# Patient Record
Sex: FEMALE | Race: WHITE | NOT HISPANIC OR LATINO | Employment: UNEMPLOYED | URBAN - NONMETROPOLITAN AREA
[De-identification: names, ages, dates, MRNs, and addresses within clinical notes are randomized per-mention and may not be internally consistent; named-entity substitution may affect disease eponyms.]

---

## 2018-03-28 ENCOUNTER — APPOINTMENT (OUTPATIENT)
Dept: ULTRASOUND IMAGING | Facility: HOSPITAL | Age: 48
End: 2018-03-28

## 2018-03-28 ENCOUNTER — APPOINTMENT (OUTPATIENT)
Dept: CT IMAGING | Facility: HOSPITAL | Age: 48
End: 2018-03-28

## 2018-03-28 ENCOUNTER — HOSPITAL ENCOUNTER (EMERGENCY)
Facility: HOSPITAL | Age: 48
Discharge: HOME | End: 2018-03-29
Admitting: EMERGENCY MEDICINE

## 2018-03-28 DIAGNOSIS — N30.90 CYSTITIS: ICD-10-CM

## 2018-03-28 DIAGNOSIS — R10.31 RLQ ABDOMINAL PAIN: Primary | ICD-10-CM

## 2018-03-28 DIAGNOSIS — D25.9 UTERINE LEIOMYOMA, UNSPECIFIED LOCATION: ICD-10-CM

## 2018-03-28 DIAGNOSIS — K59.00 CONSTIPATION, UNSPECIFIED CONSTIPATION TYPE: ICD-10-CM

## 2018-03-28 LAB
ALBUMIN SERPL-MCNC: 4.5 G/DL (ref 3.5–5)
ALBUMIN/GLOB SERPL: 1.3 G/DL (ref 1.1–2.5)
ALP SERPL-CCNC: 51 U/L (ref 24–120)
ALT SERPL W P-5'-P-CCNC: 20 U/L (ref 0–54)
ANION GAP SERPL CALCULATED.3IONS-SCNC: 13 MMOL/L (ref 4–13)
AST SERPL-CCNC: 26 U/L (ref 7–45)
BACTERIA UR QL AUTO: ABNORMAL /HPF
BASOPHILS # BLD AUTO: 0.07 10*3/MM3 (ref 0–0.2)
BASOPHILS NFR BLD AUTO: 0.6 % (ref 0–2)
BILIRUB SERPL-MCNC: 0.3 MG/DL (ref 0.1–1)
BILIRUB UR QL STRIP: NEGATIVE
BUN BLD-MCNC: 16 MG/DL (ref 5–21)
BUN/CREAT SERPL: 15.8 (ref 7–25)
CALCIUM SPEC-SCNC: 9.7 MG/DL (ref 8.4–10.4)
CHLORIDE SERPL-SCNC: 98 MMOL/L (ref 98–110)
CLARITY UR: CLEAR
CO2 SERPL-SCNC: 27 MMOL/L (ref 24–31)
COLOR UR: YELLOW
CREAT BLD-MCNC: 1.01 MG/DL (ref 0.5–1.4)
DEPRECATED RDW RBC AUTO: 39.8 FL (ref 40–54)
EOSINOPHIL # BLD AUTO: 0.22 10*3/MM3 (ref 0–0.7)
EOSINOPHIL NFR BLD AUTO: 1.9 % (ref 0–4)
ERYTHROCYTE [DISTWIDTH] IN BLOOD BY AUTOMATED COUNT: 12.1 % (ref 12–15)
GFR SERPL CREATININE-BSD FRML MDRD: 58 ML/MIN/1.73
GLOBULIN UR ELPH-MCNC: 3.6 GM/DL
GLUCOSE BLD-MCNC: 96 MG/DL (ref 70–100)
GLUCOSE UR STRIP-MCNC: NEGATIVE MG/DL
HCG SERPL QL: NEGATIVE
HCT VFR BLD AUTO: 44.1 % (ref 37–47)
HGB BLD-MCNC: 14.7 G/DL (ref 12–16)
HGB UR QL STRIP.AUTO: NEGATIVE
HOLD SPECIMEN: NORMAL
HOLD SPECIMEN: NORMAL
HYALINE CASTS UR QL AUTO: ABNORMAL /LPF
IMM GRANULOCYTES # BLD: 0.08 10*3/MM3 (ref 0–0.03)
IMM GRANULOCYTES NFR BLD: 0.7 % (ref 0–5)
KETONES UR QL STRIP: NEGATIVE
LEUKOCYTE ESTERASE UR QL STRIP.AUTO: ABNORMAL
LIPASE SERPL-CCNC: 86 U/L (ref 23–203)
LYMPHOCYTES # BLD AUTO: 3.42 10*3/MM3 (ref 0.72–4.86)
LYMPHOCYTES NFR BLD AUTO: 29.2 % (ref 15–45)
MCH RBC QN AUTO: 29.8 PG (ref 28–32)
MCHC RBC AUTO-ENTMCNC: 33.3 G/DL (ref 33–36)
MCV RBC AUTO: 89.5 FL (ref 82–98)
MONOCYTES # BLD AUTO: 0.64 10*3/MM3 (ref 0.19–1.3)
MONOCYTES NFR BLD AUTO: 5.5 % (ref 4–12)
NEUTROPHILS # BLD AUTO: 7.3 10*3/MM3 (ref 1.87–8.4)
NEUTROPHILS NFR BLD AUTO: 62.1 % (ref 39–78)
NITRITE UR QL STRIP: NEGATIVE
NRBC BLD MANUAL-RTO: 0 /100 WBC (ref 0–0)
PH UR STRIP.AUTO: 6 [PH] (ref 5–8)
PLATELET # BLD AUTO: 325 10*3/MM3 (ref 130–400)
PMV BLD AUTO: 10.2 FL (ref 6–12)
POTASSIUM BLD-SCNC: 4.3 MMOL/L (ref 3.5–5.3)
PROT SERPL-MCNC: 8.1 G/DL (ref 6.3–8.7)
PROT UR QL STRIP: NEGATIVE
RBC # BLD AUTO: 4.93 10*6/MM3 (ref 4.2–5.4)
RBC # UR: ABNORMAL /HPF
REF LAB TEST METHOD: ABNORMAL
SODIUM BLD-SCNC: 138 MMOL/L (ref 135–145)
SP GR UR STRIP: 1.02 (ref 1–1.03)
SQUAMOUS #/AREA URNS HPF: ABNORMAL /HPF
UROBILINOGEN UR QL STRIP: ABNORMAL
WBC NRBC COR # BLD: 11.73 10*3/MM3 (ref 4.8–10.8)
WBC UR QL AUTO: ABNORMAL /HPF
WHOLE BLOOD HOLD SPECIMEN: NORMAL
WHOLE BLOOD HOLD SPECIMEN: NORMAL

## 2018-03-28 PROCEDURE — 81001 URINALYSIS AUTO W/SCOPE: CPT | Performed by: NURSE PRACTITIONER

## 2018-03-28 PROCEDURE — 96376 TX/PRO/DX INJ SAME DRUG ADON: CPT

## 2018-03-28 PROCEDURE — 25010000002 MORPHINE PER 10 MG: Performed by: NURSE PRACTITIONER

## 2018-03-28 PROCEDURE — 85025 COMPLETE CBC W/AUTO DIFF WBC: CPT | Performed by: NURSE PRACTITIONER

## 2018-03-28 PROCEDURE — 25010000002 ONDANSETRON PER 1 MG: Performed by: NURSE PRACTITIONER

## 2018-03-28 PROCEDURE — 25010000002 KETOROLAC TROMETHAMINE PER 15 MG: Performed by: NURSE PRACTITIONER

## 2018-03-28 PROCEDURE — 83690 ASSAY OF LIPASE: CPT | Performed by: NURSE PRACTITIONER

## 2018-03-28 PROCEDURE — 99284 EMERGENCY DEPT VISIT MOD MDM: CPT

## 2018-03-28 PROCEDURE — 93976 VASCULAR STUDY: CPT

## 2018-03-28 PROCEDURE — 74177 CT ABD & PELVIS W/CONTRAST: CPT

## 2018-03-28 PROCEDURE — 80053 COMPREHEN METABOLIC PANEL: CPT | Performed by: NURSE PRACTITIONER

## 2018-03-28 PROCEDURE — 87086 URINE CULTURE/COLONY COUNT: CPT | Performed by: NURSE PRACTITIONER

## 2018-03-28 PROCEDURE — 25010000002 HYDROMORPHONE PER 4 MG: Performed by: NURSE PRACTITIONER

## 2018-03-28 PROCEDURE — 25010000002 IOPAMIDOL 61 % SOLUTION: Performed by: NURSE PRACTITIONER

## 2018-03-28 PROCEDURE — 96374 THER/PROPH/DIAG INJ IV PUSH: CPT

## 2018-03-28 PROCEDURE — 84703 CHORIONIC GONADOTROPIN ASSAY: CPT | Performed by: NURSE PRACTITIONER

## 2018-03-28 PROCEDURE — 76830 TRANSVAGINAL US NON-OB: CPT

## 2018-03-28 PROCEDURE — 96375 TX/PRO/DX INJ NEW DRUG ADDON: CPT

## 2018-03-28 RX ORDER — ONDANSETRON 2 MG/ML
4 INJECTION INTRAMUSCULAR; INTRAVENOUS ONCE
Status: COMPLETED | OUTPATIENT
Start: 2018-03-28 | End: 2018-03-28

## 2018-03-28 RX ORDER — MORPHINE SULFATE 4 MG/ML
4 INJECTION, SOLUTION INTRAMUSCULAR; INTRAVENOUS ONCE
Status: COMPLETED | OUTPATIENT
Start: 2018-03-28 | End: 2018-03-28

## 2018-03-28 RX ORDER — SODIUM CHLORIDE 0.9 % (FLUSH) 0.9 %
10 SYRINGE (ML) INJECTION AS NEEDED
Status: DISCONTINUED | OUTPATIENT
Start: 2018-03-28 | End: 2018-03-29 | Stop reason: HOSPADM

## 2018-03-28 RX ORDER — MELATONIN
4000 DAILY
COMMUNITY

## 2018-03-28 RX ORDER — HYDROMORPHONE HCL 110MG/55ML
0.5 PATIENT CONTROLLED ANALGESIA SYRINGE INTRAVENOUS ONCE
Status: COMPLETED | OUTPATIENT
Start: 2018-03-28 | End: 2018-03-28

## 2018-03-28 RX ORDER — KETOROLAC TROMETHAMINE 15 MG/ML
15 INJECTION, SOLUTION INTRAMUSCULAR; INTRAVENOUS ONCE
Status: COMPLETED | OUTPATIENT
Start: 2018-03-28 | End: 2018-03-28

## 2018-03-28 RX ADMIN — KETOROLAC TROMETHAMINE 15 MG: 15 INJECTION, SOLUTION INTRAMUSCULAR; INTRAVENOUS at 22:11

## 2018-03-28 RX ADMIN — ONDANSETRON 4 MG: 2 INJECTION, SOLUTION INTRAMUSCULAR; INTRAVENOUS at 23:52

## 2018-03-28 RX ADMIN — IOPAMIDOL 100 ML: 612 INJECTION, SOLUTION INTRAVENOUS at 21:23

## 2018-03-28 RX ADMIN — ONDANSETRON 4 MG: 2 INJECTION INTRAMUSCULAR; INTRAVENOUS at 20:12

## 2018-03-28 RX ADMIN — HYDROMORPHONE HYDROCHLORIDE 0.5 MG: 2 INJECTION, SOLUTION INTRAMUSCULAR; INTRAVENOUS; SUBCUTANEOUS at 20:59

## 2018-03-28 RX ADMIN — MORPHINE SULFATE 4 MG: 4 INJECTION INTRAVENOUS at 20:16

## 2018-03-28 RX ADMIN — HYDROMORPHONE HYDROCHLORIDE 1 MG: 1 INJECTION, SOLUTION INTRAMUSCULAR; INTRAVENOUS; SUBCUTANEOUS at 22:12

## 2018-03-29 VITALS
BODY MASS INDEX: 30.39 KG/M2 | RESPIRATION RATE: 16 BRPM | HEIGHT: 64 IN | SYSTOLIC BLOOD PRESSURE: 125 MMHG | OXYGEN SATURATION: 97 % | HEART RATE: 73 BPM | DIASTOLIC BLOOD PRESSURE: 75 MMHG | TEMPERATURE: 97.8 F | WEIGHT: 178 LBS

## 2018-03-29 PROCEDURE — 96372 THER/PROPH/DIAG INJ SC/IM: CPT

## 2018-03-29 PROCEDURE — 25010000002 PROMETHAZINE PER 50 MG: Performed by: NURSE PRACTITIONER

## 2018-03-29 RX ORDER — PROMETHAZINE HYDROCHLORIDE 25 MG/ML
25 INJECTION, SOLUTION INTRAMUSCULAR; INTRAVENOUS ONCE
Status: COMPLETED | OUTPATIENT
Start: 2018-03-29 | End: 2018-03-29

## 2018-03-29 RX ORDER — CEFUROXIME AXETIL 250 MG/1
250 TABLET ORAL 2 TIMES DAILY
Qty: 14 TABLET | Refills: 0 | Status: SHIPPED | OUTPATIENT
Start: 2018-03-29 | End: 2018-04-05

## 2018-03-29 RX ORDER — KETOROLAC TROMETHAMINE 10 MG/1
10 TABLET, FILM COATED ORAL EVERY 6 HOURS PRN
Qty: 16 TABLET | Refills: 0 | Status: SHIPPED | OUTPATIENT
Start: 2018-03-29 | End: 2018-04-03

## 2018-03-29 RX ORDER — ONDANSETRON 4 MG/1
4 TABLET, ORALLY DISINTEGRATING ORAL EVERY 6 HOURS PRN
Qty: 12 TABLET | Refills: 0 | Status: SHIPPED | OUTPATIENT
Start: 2018-03-29 | End: 2018-06-15

## 2018-03-29 RX ORDER — HYDROCODONE BITARTRATE AND ACETAMINOPHEN 5; 325 MG/1; MG/1
1 TABLET ORAL EVERY 4 HOURS PRN
Qty: 8 TABLET | Refills: 0 | Status: SHIPPED | OUTPATIENT
Start: 2018-03-29 | End: 2018-05-08 | Stop reason: HOSPADM

## 2018-03-29 RX ADMIN — PROMETHAZINE HYDROCHLORIDE 25 MG: 25 INJECTION INTRAMUSCULAR; INTRAVENOUS at 02:19

## 2018-03-30 LAB — BACTERIA SPEC AEROBE CULT: NORMAL

## 2018-04-20 ENCOUNTER — OFFICE VISIT (OUTPATIENT)
Dept: OBSTETRICS AND GYNECOLOGY | Facility: CLINIC | Age: 48
End: 2018-04-20

## 2018-04-20 VITALS
SYSTOLIC BLOOD PRESSURE: 144 MMHG | WEIGHT: 176 LBS | DIASTOLIC BLOOD PRESSURE: 94 MMHG | HEIGHT: 64 IN | BODY MASS INDEX: 30.05 KG/M2

## 2018-04-20 DIAGNOSIS — R15.9 INCONTINENCE OF FECES, UNSPECIFIED FECAL INCONTINENCE TYPE: ICD-10-CM

## 2018-04-20 DIAGNOSIS — R10.2 PELVIC PAIN: ICD-10-CM

## 2018-04-20 DIAGNOSIS — N92.1 MENORRHAGIA WITH IRREGULAR CYCLE: Primary | ICD-10-CM

## 2018-04-20 PROCEDURE — 99202 OFFICE O/P NEW SF 15 MIN: CPT | Performed by: NURSE PRACTITIONER

## 2018-04-20 NOTE — PROGRESS NOTES
Subjective   Emily Chou is a 47 y.o. female  YOB: 1970      Chief Complaint   Patient presents with   • Menorrhagia     New patient to practice, here for menorrhagia, painful periods. She has been experiencing this for more than a year. She was seen in [facility] ER for pelvic pain which she thought was appendicitis on 3-28-18. Testing showed she had fibroids and a displaced cecum (She will need referral to gastro). Not currently having pain.        Menorrhagia   This is a chronic problem. The current episode started more than 1 year ago. The problem occurs intermittently. The problem has been gradually worsening. Pertinent negatives include no abdominal pain, arthralgias, chest pain, coughing, fatigue, fever, headaches, joint swelling, myalgias, nausea, numbness, rash, sore throat or vomiting. Treatments tried: endometrial ablation. The treatment provided no relief.       The following portions of the patient's history were reviewed and updated as appropriate: allergies, current medications, past family history, past medical history, past social history, past surgical history and problem list.    Allergies   Allergen Reactions   • Ultram [Tramadol] Dizziness       Past Medical History:   Diagnosis Date   • Functional bowel disorder    • Hypertension        Family History   Problem Relation Age of Onset   • Heart disease Father    • No Known Problems Mother    • No Known Problems Brother    • Heart disease Paternal Grandmother    • Heart disease Maternal Grandmother    • No Known Problems Brother    • Uterine cancer Other    • Breast cancer Neg Hx    • Ovarian cancer Neg Hx        Social History     Social History   • Marital status: Single     Spouse name: N/A   • Number of children: N/A   • Years of education: N/A     Occupational History   • Not on file.     Social History Main Topics   • Smoking status: Never Smoker   • Smokeless tobacco: Never Used   • Alcohol use 1.8 oz/week     3 Glasses of  wine per week   • Drug use: No   • Sexual activity: Not Currently     Birth control/ protection: None     Other Topics Concern   • Not on file     Social History Narrative   • No narrative on file         Current Outpatient Prescriptions:   •  cholecalciferol (VITAMIN D3) 1000 units tablet, Take 4,000 Units by mouth Daily., Disp: , Rfl:   •  ondansetron ODT (ZOFRAN-ODT) 4 MG disintegrating tablet, Take 1 tablet by mouth Every 6 (Six) Hours As Needed for Nausea or Vomiting., Disp: 12 tablet, Rfl: 0  •  HYDROcodone-acetaminophen (NORCO) 5-325 MG per tablet, Take 1 tablet by mouth Every 4 (Four) Hours As Needed for Severe Pain ., Disp: 8 tablet, Rfl: 0    Menstrual History:  OB History      Para Term  AB Living    1 0 0 0 0 1    SAB TAB Ectopic Molar Multiple Live Births    0 0 0 0 0 1         Patient's last menstrual period was 2018 (exact date).       Patient's last menstrual period was 2018 (exact date).    Sexual History:         Could not be calculated    Past Surgical History:   Procedure Laterality Date   • BLADDER SUSPENSION      Dr. Giles in [place]   • ENDOMETRIAL ABLATION         Review of Systems   Constitutional: Negative for activity change, appetite change, fatigue and fever.   HENT: Negative for ear pain, hearing loss, sore throat and trouble swallowing.    Eyes: Negative for pain, discharge and visual disturbance.   Respiratory: Negative for apnea, cough, chest tightness and shortness of breath.    Cardiovascular: Negative for chest pain and palpitations.   Gastrointestinal: Negative for abdominal distention, abdominal pain, blood in stool, constipation, diarrhea, nausea and vomiting.   Endocrine: Negative for heat intolerance, polydipsia and polyuria.   Genitourinary: Positive for menorrhagia and menstrual problem. Negative for difficulty urinating, dyspareunia, dysuria, frequency, pelvic pain, urgency, vaginal bleeding, vaginal discharge and vaginal pain.        Fecal  "incontinence   Musculoskeletal: Negative for arthralgias, back pain, joint swelling and myalgias.   Skin: Negative for rash and wound.   Allergic/Immunologic: Negative for environmental allergies and immunocompromised state.   Neurological: Negative for dizziness, tremors, seizures, numbness and headaches.   Hematological: Negative for adenopathy. Does not bruise/bleed easily.   Psychiatric/Behavioral: Negative for agitation, confusion and sleep disturbance. The patient is not nervous/anxious.        Objective   Physical Exam   Constitutional: She is oriented to person, place, and time. She appears well-developed and well-nourished.   Cardiovascular: Normal rate and regular rhythm.    Pulmonary/Chest: Effort normal and breath sounds normal.   Neurological: She is alert and oriented to person, place, and time.   Psychiatric: She has a normal mood and affect. Her behavior is normal.   Nursing note and vitals reviewed.        Vitals:    04/20/18 1028   BP: 144/94   BP Location: Left arm   Patient Position: Sitting   Cuff Size: Adult   Weight: 79.8 kg (176 lb)   Height: 162.6 cm (64\")       Emily was seen today for menorrhagia.    Diagnoses and all orders for this visit:    Menorrhagia with irregular cycle  Comments:  Patient reports painful, heavy, frequent periods times several years.  Was seen in the ER 3/28/18 for pelvic pain where US and CT were done.  US showed a 1.8 cm submucosal fibroid.  H/o endometrial ablation.  Discussed treatment options and risks/benefits.  Patient desires a hysterectomy.  Appointment made with Dr. Young to discuss.     Incontinence of feces, unspecified fecal incontinence type  Comments:  Patient reports fecal incontinence that has gradually worsened over the last few years.  Reports fecal incontinence spontaneously and also with intercourse - states she is no longer sexually active as a result.  Appointment made with Dr. Young for evaluation and treatment    Pelvic " pain  Comments:  Patient was seen in the ER 3/28/18 for pelvic pain.  CT and US were done and showed a 1.8 cm submucosal fibroid, otherwise normal.  Pain has since resolved.            Patient's Body mass index is 30.21 kg/m². BMI is above normal parameters. Follow-up plan includes:  nutrition counseling.             Non-Smoker    MyChart Instructions Given

## 2018-04-26 ENCOUNTER — OFFICE VISIT (OUTPATIENT)
Dept: OBSTETRICS AND GYNECOLOGY | Facility: CLINIC | Age: 48
End: 2018-04-26

## 2018-04-26 VITALS
DIASTOLIC BLOOD PRESSURE: 100 MMHG | SYSTOLIC BLOOD PRESSURE: 140 MMHG | WEIGHT: 178 LBS | HEIGHT: 64 IN | BODY MASS INDEX: 30.39 KG/M2

## 2018-04-26 DIAGNOSIS — N81.6 BADEN-WALKER GRADE 3 RECTOCELE: ICD-10-CM

## 2018-04-26 DIAGNOSIS — N39.46 URINARY INCONTINENCE, MIXED: ICD-10-CM

## 2018-04-26 DIAGNOSIS — N92.0 MENORRHAGIA WITH REGULAR CYCLE: Primary | ICD-10-CM

## 2018-04-26 DIAGNOSIS — Z98.890 S/P ENDOMETRIAL ABLATION: ICD-10-CM

## 2018-04-26 DIAGNOSIS — R15.9 INCONTINENCE OF FECES, UNSPECIFIED FECAL INCONTINENCE TYPE: ICD-10-CM

## 2018-04-26 DIAGNOSIS — Z78.9 NONSMOKER: ICD-10-CM

## 2018-04-26 PROCEDURE — 99205 OFFICE O/P NEW HI 60 MIN: CPT | Performed by: OBSTETRICS & GYNECOLOGY

## 2018-04-26 RX ORDER — PHENAZOPYRIDINE HYDROCHLORIDE 100 MG/1
200 TABLET, FILM COATED ORAL ONCE
Status: CANCELLED | OUTPATIENT
Start: 2018-04-26 | End: 2018-04-26

## 2018-04-26 NOTE — PROGRESS NOTES
"[facility]  Emily Chou  : 1970  MRN: [id number]  CSN: [id number]    History and Physical    Subjective   Emily Chou is a 47 y.o. year old  who presents for consultation about surgery due to cramping with clotting, but also due to both fecal and urinary incontinence.    Patient reports that periods are regular, every month.  Bleeding is heavy and cramps incapacitating for the first 3 days; bleeding lasts about 7 days.  Patient not currently sexually active, but her last partner had had a vasectomy.    Bladder leakage occurs with any physical exertion - running, coughing, sneezing.  She had a bladder mesh placed by Dr. Giles in [place] and then had it \"revised\" 11 days later because it was too tight and she could not void.  Leakage now is about the same as it was before she ever had the surgery.  She does also report \"sometimes\" when asked about urge incontinence.    Fecal incontinence has been occurring for 2-3 years and progressively gotten worse.  It initially started because she would pass stool when she went to bathroom to void, but recently has been occurring with other activies - during yoga, getting out of a car, or while having sex.  Likely unrelated, the patient for years has had very rare deep rectal pain, that is breath-taking, but only lasts for a few minutes.  She had manomatry, endoanal u/s and a barium enema done last year when living in [place].  She was offered biofeedback training for incomplete emptying.    Past Medical History:   Diagnosis Date   • Functional bowel disorder    • Hypertension      Past Surgical History:   Procedure Laterality Date   • BLADDER SUSPENSION      Dr. Giles in [place]   • ENDOMETRIAL ABLATION       Smoking status: Never Smoker                                                              Smokeless tobacco: Never Used                          Current Outpatient Prescriptions:   •  cholecalciferol (VITAMIN D3) 1000 units tablet, Take 4,000 Units by mouth " "Daily., Disp: , Rfl:   •  HYDROcodone-acetaminophen (NORCO) 5-325 MG per tablet, Take 1 tablet by mouth Every 4 (Four) Hours As Needed for Severe Pain ., Disp: 8 tablet, Rfl: 0  •  ondansetron ODT (ZOFRAN-ODT) 4 MG disintegrating tablet, Take 1 tablet by mouth Every 6 (Six) Hours As Needed for Nausea or Vomiting., Disp: 12 tablet, Rfl: 0    Allergies   Allergen Reactions   • Ultram [Tramadol] Dizziness       Family History   Problem Relation Age of Onset   • Heart disease Father    • No Known Problems Mother    • No Known Problems Brother    • Heart disease Paternal Grandmother    • Heart disease Maternal Grandmother    • No Known Problems Brother    • Uterine cancer Other    • Breast cancer Neg Hx    • Ovarian cancer Neg Hx      Review of Systems   Respiratory: Negative for shortness of breath.    Cardiovascular: Negative for chest pain.   Gastrointestinal: Negative for abdominal pain.        Fecal incontinence for 1-2 years   Endocrine: Negative for cold intolerance and heat intolerance.   Genitourinary: Positive for enuresis (mixed RADHA and OAB) and menstrual problem (heavy, painful periods with clots). Negative for difficulty urinating, dyspareunia and vaginal bleeding.   Skin: Negative for rash.   Psychiatric/Behavioral: Negative for dysphoric mood. The patient is not nervous/anxious.          Objective   /100   Ht 162.6 cm (64\")   Wt 80.7 kg (178 lb)   LMP 04/16/2018 (Exact Date)   BMI 30.55 kg/m²     Physical Exam   Physical Exam   Constitutional: She is oriented to person, place, and time. She appears well-developed and well-nourished. No distress.   HENT:   Head: Normocephalic and atraumatic.   Pulmonary/Chest: Effort normal.   Abdominal: Soft. There is no tenderness.   Genitourinary: Uterus normal. Rectal exam shows anal tone abnormal (rectal tone less than expected, but not sufficiently poor to explain all of her symptoms). Pelvic exam was performed with patient supine. There is no tenderness or " lesion on the right labia. There is no tenderness or lesion on the left labia. Uterus is not enlarged and not tender. Cervix exhibits no motion tenderness, no discharge and no friability. Right adnexum displays no tenderness and no fullness. Left adnexum displays no tenderness and no fullness. No tenderness or bleeding in the vagina. No signs of injury around the vagina. No vaginal discharge found.   Genitourinary Comments: Grade III rectocele.  Good uterine support.  Vaginal mucosa unremarkable   Musculoskeletal: Normal range of motion.   Neurological: She is alert and oriented to person, place, and time.   Skin: Skin is warm and dry.   Psychiatric: She has a normal mood and affect. Her behavior is normal.   Nursing note and vitals reviewed.      Labs  Lab Results   Component Value Date     03/28/2018    HGB 14.7 03/28/2018    HCT 44.1 03/28/2018    WBC 11.73 (H) 03/28/2018     03/28/2018    K 4.3 03/28/2018    CL 98 03/28/2018    CO2 27.0 03/28/2018    BUN 16 03/28/2018    CREATININE 1.01 03/28/2018    GLUCOSE 96 03/28/2018    ALBUMIN 4.50 03/28/2018    CALCIUM 9.7 03/28/2018    AST 26 03/28/2018    ALT 20 03/28/2018    BILITOT 0.3 03/28/2018        Assessment & Plan    Emily was seen today for menorrhagia.    Diagnoses and all orders for this visit:    Menorrhagia with regular cycle: Patient reports heavy painful periods which occur at regular intervals, but often included accidents.  Her bleeding is dysfunctional despite a history of endometrial ablation in the past.  The patient now desires definitive therapy in the form of a total laparoscopic hysterectomy.  We have discussed bilateral salpingectomy for reduction of ovarian cancer risk    The pros and cons of ovarian conservation were reviewed, and the patient would like to leave her ovaries in situ.  Risks of laparoscopic surgery were reviewed to include, but are not limited to, the possibility of bowel perforation requiring possible bowel  resection and/or colostomy, possible bladder injury or possible and possible vascular injury which could require the need for a blood transfusion.  Possible need for conversion to a laparotomy was also discussed.  The patient's questions were answered to her satisfaction.  Post-op restrictions and typical post-op course were also reviewed.  Patient being scheduled for surgery on May 7; her posterior repair will be performed at the same time.  -     Case Request; Standing  -     CBC and Differential; Future  -     ECG 12 Lead; Future  -     phenazopyridine (PYRIDIUM) tablet 200 mg; Take 2 tablets by mouth 1 (One) Time.  -     ceFAZolin (ANCEF) 2 g in sodium chloride 0.9 % 100 mL IVPB; Infuse 2 g into a venous catheter Every 8 (Eight) Hours.  -     Case Request    Urinary incontinence, mixed: The patient has a history of mid urethral sling placed by her previous gynecologist, with revision/transection of the mesh just 3 weeks postop due to urinary retention.  The patient has been advised that a new mid urethral sling could be placed, or that pelvic physical therapy would also be helpful for her stress urinary incontinence.  The patient does also have a component of overactivity-associated incontinence, and we have answered all the patient's questions about InterStim in regard to this.  The patient was given the option of pursuing hysterectomy and rectocele repair first, or doing an in office trial of InterStim first; the patient has chosen to pursue hysterectomy first since I really feel like stool trapping in a rectocele is the most significant component of her fecal incontinence.      Incontinence of feces, unspecified fecal incontinence type:  As discussed above, and least 20-25 minutes of this visit was spent just discussing InterStim.  We have reviewed an in office temporary trial of the device which could be worn for 4-5 days.  The patient understands that the purpose of InterStim is to increase poor rectal  tone.  Since the patient also has a significant rectocele with stool trapping, we are going to surgically address that first, and potentially do a trial of InterStim afterward if she is still symptomatically.      Nonsmoker    BMI 30.0-30.9,adult    Cordell-Walker grade 3 rectocele    S/P endometrial ablation    Other orders  -     Follow Anesthesia Guidelines / Standing Orders; Future  -     Follow Anesthesia Guidelines / Standing Orders; Standing  -     Type & Screen; Standing  -     Outpatient In A Bed; Standing  -     Obtain informed consent; Standing  -     Place sequential compression device; Standing        Deana Young MD  4/26/2018  11:04 AM

## 2018-04-26 NOTE — H&P
"Attempted to obtain health maintenance information, patient unable to provide answers for the following items Tdap and annual physical.      [facility]  Emily Chou  : 1970  MRN: [id number]  CSN: [id number]    History and Physical    Subjective   Emily Chou is a 47 y.o. year old  who presents for consultation about surgery due to cramping with clotting, but also due to both fecal and urinary incontinence.    Patient reports that periods are regular, every month.  Bleeding is heavy and cramps incapacitating for the first 3 days; bleeding lasts about 7 days.  Patient not currently sexually active, but her last partner had had a vasectomy.    Bladder leakage occurs with any physical exertion - running, coughing, sneezing.  She had a bladder mesh placed by Dr. Giles in [place] and then had it \"revised\" 11 days later because it was too tight and she could not void.  Leakage now is about the same as it was before she ever had the surgery.  She does also report \"sometimes\" when asked about urge incontinence.    Fecal incontinence has been occurring for 2-3 years and progressively gotten worse.  It initially started because she would pass stool when she went to bathroom to void, but recently has been occurring with other activies - during yoga, getting out of a car, or while having sex.  Likely unrelated, the patient for years has had very rare deep rectal pain, that is breath-taking, but only lasts for a few minutes.  She had manomatry, endoanal u/s and a barium enema done last year when living in [place].  She was offered biofeedback training for incomplete emptying.    Past Medical History:   Diagnosis Date   • Functional bowel disorder    • Hypertension      Past Surgical History:   Procedure Laterality Date   • BLADDER SUSPENSION      Dr. Giles in [place]   • ENDOMETRIAL ABLATION       Smoking status: Never Smoker                                                              Smokeless tobacco: Never " "Used                          Current Outpatient Prescriptions:   •  cholecalciferol (VITAMIN D3) 1000 units tablet, Take 4,000 Units by mouth Daily., Disp: , Rfl:   •  HYDROcodone-acetaminophen (NORCO) 5-325 MG per tablet, Take 1 tablet by mouth Every 4 (Four) Hours As Needed for Severe Pain ., Disp: 8 tablet, Rfl: 0  •  ondansetron ODT (ZOFRAN-ODT) 4 MG disintegrating tablet, Take 1 tablet by mouth Every 6 (Six) Hours As Needed for Nausea or Vomiting., Disp: 12 tablet, Rfl: 0    Allergies   Allergen Reactions   • Ultram [Tramadol] Dizziness       Family History   Problem Relation Age of Onset   • Heart disease Father    • No Known Problems Mother    • No Known Problems Brother    • Heart disease Paternal Grandmother    • Heart disease Maternal Grandmother    • No Known Problems Brother    • Uterine cancer Other    • Breast cancer Neg Hx    • Ovarian cancer Neg Hx      Review of Systems   Respiratory: Negative for shortness of breath.    Cardiovascular: Negative for chest pain.   Gastrointestinal: Negative for abdominal pain.        Fecal incontinence for 1-2 years   Endocrine: Negative for cold intolerance and heat intolerance.   Genitourinary: Positive for enuresis (mixed RADHA and OAB) and menstrual problem (heavy, painful periods with clots). Negative for difficulty urinating, dyspareunia and vaginal bleeding.   Skin: Negative for rash.   Psychiatric/Behavioral: Negative for dysphoric mood. The patient is not nervous/anxious.          Objective   /100   Ht 162.6 cm (64\")   Wt 80.7 kg (178 lb)   LMP 04/16/2018 (Exact Date)   BMI 30.55 kg/m²      Physical Exam   Physical Exam   Constitutional: She is oriented to person, place, and time. She appears well-developed and well-nourished. No distress.   HENT:   Head: Normocephalic and atraumatic.   Pulmonary/Chest: Effort normal.   Abdominal: Soft. There is no tenderness.   Genitourinary: Uterus normal. Rectal exam shows anal tone abnormal (rectal tone less " than expected, but not sufficiently poor to explain all of her symptoms). Pelvic exam was performed with patient supine. There is no tenderness or lesion on the right labia. There is no tenderness or lesion on the left labia. Uterus is not enlarged and not tender. Cervix exhibits no motion tenderness, no discharge and no friability. Right adnexum displays no tenderness and no fullness. Left adnexum displays no tenderness and no fullness. No tenderness or bleeding in the vagina. No signs of injury around the vagina. No vaginal discharge found.   Genitourinary Comments: Grade III rectocele.  Good uterine support.  Vaginal mucosa unremarkable   Musculoskeletal: Normal range of motion.   Neurological: She is alert and oriented to person, place, and time.   Skin: Skin is warm and dry.   Psychiatric: She has a normal mood and affect. Her behavior is normal.   Nursing note and vitals reviewed.      Labs  Lab Results   Component Value Date     03/28/2018    HGB 14.7 03/28/2018    HCT 44.1 03/28/2018    WBC 11.73 (H) 03/28/2018     03/28/2018    K 4.3 03/28/2018    CL 98 03/28/2018    CO2 27.0 03/28/2018    BUN 16 03/28/2018    CREATININE 1.01 03/28/2018    GLUCOSE 96 03/28/2018    ALBUMIN 4.50 03/28/2018    CALCIUM 9.7 03/28/2018    AST 26 03/28/2018    ALT 20 03/28/2018    BILITOT 0.3 03/28/2018        Assessment & Plan    Emily was seen today for menorrhagia.    Diagnoses and all orders for this visit:    Menorrhagia with regular cycle: Patient reports heavy painful periods which occur at regular intervals, but often included accidents.  Her bleeding is dysfunctional despite a history of endometrial ablation in the past.  The patient now desires definitive therapy in the form of a total laparoscopic hysterectomy.  We have discussed bilateral salpingectomy for reduction of ovarian cancer risk    The pros and cons of ovarian conservation were reviewed, and the patient would like to leave her ovaries in situ.   Risks of laparoscopic surgery were reviewed to include, but are not limited to, the possibility of bowel perforation requiring possible bowel resection and/or colostomy, possible bladder injury or possible and possible vascular injury which could require the need for a blood transfusion.  Possible need for conversion to a laparotomy was also discussed.  The patient's questions were answered to her satisfaction.  Post-op restrictions and typical post-op course were also reviewed.  Patient being scheduled for surgery on May 7; her posterior repair will be performed at the same time.  -     Case Request; Standing  -     CBC and Differential; Future  -     ECG 12 Lead; Future  -     phenazopyridine (PYRIDIUM) tablet 200 mg; Take 2 tablets by mouth 1 (One) Time.  -     ceFAZolin (ANCEF) 2 g in sodium chloride 0.9 % 100 mL IVPB; Infuse 2 g into a venous catheter Every 8 (Eight) Hours.  -     Case Request    Urinary incontinence, mixed: The patient has a history of mid urethral sling placed by her previous gynecologist, with revision/transection of the mesh just 3 weeks postop due to urinary retention.  The patient has been advised that a new mid urethral sling could be placed, or that pelvic physical therapy would also be helpful for her stress urinary incontinence.  The patient does also have a component of overactivity-associated incontinence, and we have answered all the patient's questions about InterStim in regard to this.  The patient was given the option of pursuing hysterectomy and rectocele repair first, or doing an in office trial of InterStim first; the patient has chosen to pursue hysterectomy first since I really feel like stool trapping in a rectocele is the most significant component of her fecal incontinence.      Incontinence of feces, unspecified fecal incontinence type:  As discussed above, and least 20-25 minutes of this visit was spent just discussing InterStim.  We have reviewed an in office  temporary trial of the device which could be worn for 4-5 days.  The patient understands that the purpose of InterStim is to increase poor rectal tone.  Since the patient also has a significant rectocele with stool trapping, we are going to surgically address that first, and potentially do a trial of InterStim afterward if she is still symptomatically.      Nonsmoker    BMI 30.0-30.9,adult    Wilsons-Walker grade 3 rectocele    S/P endometrial ablation    Other orders  -     Follow Anesthesia Guidelines / Standing Orders; Future  -     Follow Anesthesia Guidelines / Standing Orders; Standing  -     Type & Screen; Standing  -     Outpatient In A Bed; Standing  -     Obtain informed consent; Standing  -     Place sequential compression device; Standing        Deana Young MD  4/26/2018  11:04 AM

## 2018-04-26 NOTE — PROGRESS NOTES
Attempted to obtain health maintenance information, patient unable to provide answers for the following items Tdap and annual physical.

## 2018-04-30 ENCOUNTER — APPOINTMENT (OUTPATIENT)
Dept: PREADMISSION TESTING | Facility: HOSPITAL | Age: 48
End: 2018-04-30

## 2018-04-30 VITALS
WEIGHT: 180.56 LBS | OXYGEN SATURATION: 100 % | HEIGHT: 63 IN | DIASTOLIC BLOOD PRESSURE: 91 MMHG | HEART RATE: 92 BPM | BODY MASS INDEX: 31.99 KG/M2 | SYSTOLIC BLOOD PRESSURE: 145 MMHG

## 2018-04-30 DIAGNOSIS — N92.0 MENORRHAGIA WITH REGULAR CYCLE: ICD-10-CM

## 2018-04-30 LAB
ANION GAP SERPL CALCULATED.3IONS-SCNC: 12 MMOL/L (ref 4–13)
BASOPHILS # BLD AUTO: 0.06 10*3/MM3 (ref 0–0.2)
BASOPHILS NFR BLD AUTO: 0.9 % (ref 0–2)
BUN BLD-MCNC: 17 MG/DL (ref 5–21)
BUN/CREAT SERPL: 18.9 (ref 7–25)
CALCIUM SPEC-SCNC: 9.5 MG/DL (ref 8.4–10.4)
CHLORIDE SERPL-SCNC: 102 MMOL/L (ref 98–110)
CO2 SERPL-SCNC: 26 MMOL/L (ref 24–31)
CREAT BLD-MCNC: 0.9 MG/DL (ref 0.5–1.4)
DEPRECATED RDW RBC AUTO: 39.8 FL (ref 40–54)
EOSINOPHIL # BLD AUTO: 0.2 10*3/MM3 (ref 0–0.7)
EOSINOPHIL NFR BLD AUTO: 2.9 % (ref 0–4)
ERYTHROCYTE [DISTWIDTH] IN BLOOD BY AUTOMATED COUNT: 12.3 % (ref 12–15)
GFR SERPL CREATININE-BSD FRML MDRD: 67 ML/MIN/1.73
GLUCOSE BLD-MCNC: 83 MG/DL (ref 70–100)
HCT VFR BLD AUTO: 40.4 % (ref 37–47)
HGB BLD-MCNC: 13.7 G/DL (ref 12–16)
IMM GRANULOCYTES # BLD: 0.08 10*3/MM3 (ref 0–0.03)
IMM GRANULOCYTES NFR BLD: 1.1 % (ref 0–5)
LYMPHOCYTES # BLD AUTO: 2.3 10*3/MM3 (ref 0.72–4.86)
LYMPHOCYTES NFR BLD AUTO: 32.9 % (ref 15–45)
MCH RBC QN AUTO: 30.1 PG (ref 28–32)
MCHC RBC AUTO-ENTMCNC: 33.9 G/DL (ref 33–36)
MCV RBC AUTO: 88.8 FL (ref 82–98)
MONOCYTES # BLD AUTO: 0.46 10*3/MM3 (ref 0.19–1.3)
MONOCYTES NFR BLD AUTO: 6.6 % (ref 4–12)
NEUTROPHILS # BLD AUTO: 3.89 10*3/MM3 (ref 1.87–8.4)
NEUTROPHILS NFR BLD AUTO: 55.6 % (ref 39–78)
NRBC BLD MANUAL-RTO: 0 /100 WBC (ref 0–0)
PLATELET # BLD AUTO: 268 10*3/MM3 (ref 130–400)
PMV BLD AUTO: 10.6 FL (ref 6–12)
POTASSIUM BLD-SCNC: 4.4 MMOL/L (ref 3.5–5.3)
RBC # BLD AUTO: 4.55 10*6/MM3 (ref 4.2–5.4)
SODIUM BLD-SCNC: 140 MMOL/L (ref 135–145)
WBC NRBC COR # BLD: 6.99 10*3/MM3 (ref 4.8–10.8)

## 2018-04-30 PROCEDURE — 93010 ELECTROCARDIOGRAM REPORT: CPT | Performed by: INTERNAL MEDICINE

## 2018-04-30 PROCEDURE — 80048 BASIC METABOLIC PNL TOTAL CA: CPT | Performed by: OBSTETRICS & GYNECOLOGY

## 2018-04-30 PROCEDURE — 85025 COMPLETE CBC W/AUTO DIFF WBC: CPT | Performed by: OBSTETRICS & GYNECOLOGY

## 2018-04-30 PROCEDURE — 36415 COLL VENOUS BLD VENIPUNCTURE: CPT

## 2018-04-30 PROCEDURE — 93005 ELECTROCARDIOGRAM TRACING: CPT

## 2018-05-01 ENCOUNTER — RESULTS ENCOUNTER (OUTPATIENT)
Dept: OBSTETRICS AND GYNECOLOGY | Facility: CLINIC | Age: 48
End: 2018-05-01

## 2018-05-01 DIAGNOSIS — N92.0 MENORRHAGIA WITH REGULAR CYCLE: ICD-10-CM

## 2018-05-02 DIAGNOSIS — R94.31 ABNORMAL EKG: Primary | ICD-10-CM

## 2018-05-03 ENCOUNTER — HOSPITAL ENCOUNTER (OUTPATIENT)
Dept: CARDIOLOGY | Facility: HOSPITAL | Age: 48
Discharge: HOME | End: 2018-05-03
Attending: OBSTETRICS & GYNECOLOGY | Admitting: OBSTETRICS & GYNECOLOGY

## 2018-05-03 DIAGNOSIS — R94.31 ABNORMAL EKG: ICD-10-CM

## 2018-05-03 PROCEDURE — 93017 CV STRESS TEST TRACING ONLY: CPT

## 2018-05-03 PROCEDURE — 93018 CV STRESS TEST I&R ONLY: CPT | Performed by: INTERNAL MEDICINE

## 2018-05-04 ENCOUNTER — ANESTHESIA EVENT (OUTPATIENT)
Dept: PERIOP | Facility: HOSPITAL | Age: 48
End: 2018-05-04

## 2018-05-04 LAB
BH CV STRESS BP STAGE 1: NORMAL
BH CV STRESS BP STAGE 2: NORMAL
BH CV STRESS BP STAGE 3: NORMAL
BH CV STRESS BP STAGE 4: NORMAL
BH CV STRESS DURATION MIN STAGE 1: 3
BH CV STRESS DURATION MIN STAGE 2: 3
BH CV STRESS DURATION MIN STAGE 3: 3
BH CV STRESS DURATION MIN STAGE 4: 1
BH CV STRESS DURATION SEC STAGE 1: 0
BH CV STRESS DURATION SEC STAGE 2: 0
BH CV STRESS DURATION SEC STAGE 3: 0
BH CV STRESS DURATION SEC STAGE 4: 2
BH CV STRESS GRADE STAGE 1: 10
BH CV STRESS GRADE STAGE 2: 12
BH CV STRESS GRADE STAGE 3: 14
BH CV STRESS GRADE STAGE 4: 16
BH CV STRESS HR STAGE 1: 116
BH CV STRESS HR STAGE 2: 140
BH CV STRESS HR STAGE 3: 158
BH CV STRESS HR STAGE 4: 167
BH CV STRESS METS STAGE 1: 5
BH CV STRESS METS STAGE 2: 7.5
BH CV STRESS METS STAGE 3: 10
BH CV STRESS METS STAGE 4: 13.5
BH CV STRESS PROTOCOL 1: NORMAL
BH CV STRESS RECOVERY BP: NORMAL MMHG
BH CV STRESS RECOVERY HR: 96 BPM
BH CV STRESS SPEED STAGE 1: 1.7
BH CV STRESS SPEED STAGE 2: 2.5
BH CV STRESS SPEED STAGE 3: 3.4
BH CV STRESS SPEED STAGE 4: 4.2
BH CV STRESS STAGE 1: 1
BH CV STRESS STAGE 2: 2
BH CV STRESS STAGE 3: 3
BH CV STRESS STAGE 4: 4
MAXIMAL PREDICTED HEART RATE: 171 BPM
PERCENT MAX PREDICTED HR: 97.66 %
STRESS BASELINE BP: NORMAL MMHG
STRESS BASELINE HR: 94 BPM
STRESS PERCENT HR: 115 %
STRESS POST ESTIMATED WORKLOAD: 13.5 METS
STRESS POST EXERCISE DUR MIN: 10 MIN
STRESS POST EXERCISE DUR SEC: 2 SEC
STRESS POST PEAK BP: NORMAL MMHG
STRESS POST PEAK HR: 167 BPM
STRESS TARGET HR: 145 BPM

## 2018-05-07 ENCOUNTER — HOSPITAL ENCOUNTER (OUTPATIENT)
Facility: HOSPITAL | Age: 48
Discharge: HOME | End: 2018-05-08
Attending: OBSTETRICS & GYNECOLOGY | Admitting: OBSTETRICS & GYNECOLOGY

## 2018-05-07 ENCOUNTER — ANESTHESIA (OUTPATIENT)
Dept: PERIOP | Facility: HOSPITAL | Age: 48
End: 2018-05-07

## 2018-05-07 DIAGNOSIS — N92.0 MENORRHAGIA WITH REGULAR CYCLE: Primary | ICD-10-CM

## 2018-05-07 PROBLEM — N39.46 URINARY INCONTINENCE, MIXED: Status: ACTIVE | Noted: 2018-05-07

## 2018-05-07 LAB
ABO GROUP BLD: NORMAL
B-HCG UR QL: NEGATIVE
BLD GP AB SCN SERPL QL: NEGATIVE
RH BLD: POSITIVE
T&S EXPIRATION DATE: NORMAL

## 2018-05-07 PROCEDURE — 25010000003 CEFAZOLIN PER 500 MG: Performed by: OBSTETRICS & GYNECOLOGY

## 2018-05-07 PROCEDURE — 25010000002 PROPOFOL 10 MG/ML EMULSION: Performed by: NURSE ANESTHETIST, CERTIFIED REGISTERED

## 2018-05-07 PROCEDURE — 81025 URINE PREGNANCY TEST: CPT | Performed by: OBSTETRICS & GYNECOLOGY

## 2018-05-07 PROCEDURE — 25010000002 FENTANYL CITRATE (PF) 100 MCG/2ML SOLUTION: Performed by: NURSE ANESTHETIST, CERTIFIED REGISTERED

## 2018-05-07 PROCEDURE — 25810000003 SODIUM CHLORIDE 0.9 % WITH KCL 20 MEQ 20-0.9 MEQ/L-% SOLUTION: Performed by: OBSTETRICS & GYNECOLOGY

## 2018-05-07 PROCEDURE — 86850 RBC ANTIBODY SCREEN: CPT | Performed by: OBSTETRICS & GYNECOLOGY

## 2018-05-07 PROCEDURE — 25010000002 NEOSTIGMINE PER 0.5 MG: Performed by: NURSE ANESTHETIST, CERTIFIED REGISTERED

## 2018-05-07 PROCEDURE — S2900 ROBOTIC SURGICAL SYSTEM: HCPCS | Performed by: OBSTETRICS & GYNECOLOGY

## 2018-05-07 PROCEDURE — 25010000002 DEXAMETHASONE PER 1 MG: Performed by: NURSE ANESTHETIST, CERTIFIED REGISTERED

## 2018-05-07 PROCEDURE — 25010000002 ONDANSETRON PER 1 MG: Performed by: ANESTHESIOLOGY

## 2018-05-07 PROCEDURE — 94799 UNLISTED PULMONARY SVC/PX: CPT

## 2018-05-07 PROCEDURE — 58571 TLH W/T/O 250 G OR LESS: CPT | Performed by: OBSTETRICS & GYNECOLOGY

## 2018-05-07 PROCEDURE — 86901 BLOOD TYPING SEROLOGIC RH(D): CPT | Performed by: OBSTETRICS & GYNECOLOGY

## 2018-05-07 PROCEDURE — 86900 BLOOD TYPING SEROLOGIC ABO: CPT | Performed by: OBSTETRICS & GYNECOLOGY

## 2018-05-07 PROCEDURE — 25010000002 MIDAZOLAM PER 1 MG: Performed by: ANESTHESIOLOGY

## 2018-05-07 PROCEDURE — 25010000002 DEXAMETHASONE PER 1 MG: Performed by: ANESTHESIOLOGY

## 2018-05-07 PROCEDURE — 25010000002 ONDANSETRON PER 1 MG: Performed by: NURSE ANESTHETIST, CERTIFIED REGISTERED

## 2018-05-07 PROCEDURE — 88307 TISSUE EXAM BY PATHOLOGIST: CPT | Performed by: OBSTETRICS & GYNECOLOGY

## 2018-05-07 PROCEDURE — 57250 REPAIR RECTUM & VAGINA: CPT | Performed by: OBSTETRICS & GYNECOLOGY

## 2018-05-07 PROCEDURE — 25010000002 KETOROLAC TROMETHAMINE PER 15 MG: Performed by: OBSTETRICS & GYNECOLOGY

## 2018-05-07 PROCEDURE — 25010000002 SUCCINYLCHOLINE PER 20 MG: Performed by: NURSE ANESTHETIST, CERTIFIED REGISTERED

## 2018-05-07 RX ORDER — LIDOCAINE HYDROCHLORIDE 40 MG/ML
SOLUTION TOPICAL AS NEEDED
Status: DISCONTINUED | OUTPATIENT
Start: 2018-05-07 | End: 2018-05-07 | Stop reason: SURG

## 2018-05-07 RX ORDER — SODIUM CHLORIDE, SODIUM LACTATE, POTASSIUM CHLORIDE, CALCIUM CHLORIDE 600; 310; 30; 20 MG/100ML; MG/100ML; MG/100ML; MG/100ML
1000 INJECTION, SOLUTION INTRAVENOUS CONTINUOUS
Status: DISCONTINUED | OUTPATIENT
Start: 2018-05-07 | End: 2018-05-07 | Stop reason: HOSPADM

## 2018-05-07 RX ORDER — ONDANSETRON 4 MG/1
4 TABLET, FILM COATED ORAL EVERY 6 HOURS PRN
Status: DISCONTINUED | OUTPATIENT
Start: 2018-05-07 | End: 2018-05-08 | Stop reason: HOSPADM

## 2018-05-07 RX ORDER — ONDANSETRON 2 MG/ML
4 INJECTION INTRAMUSCULAR; INTRAVENOUS ONCE AS NEEDED
Status: COMPLETED | OUTPATIENT
Start: 2018-05-07 | End: 2018-05-07

## 2018-05-07 RX ORDER — DOCUSATE SODIUM 100 MG/1
100 CAPSULE, LIQUID FILLED ORAL 2 TIMES DAILY PRN
Status: DISCONTINUED | OUTPATIENT
Start: 2018-05-07 | End: 2018-05-08 | Stop reason: HOSPADM

## 2018-05-07 RX ORDER — OXYCODONE AND ACETAMINOPHEN 10; 325 MG/1; MG/1
1 TABLET ORAL EVERY 4 HOURS PRN
Status: DISCONTINUED | OUTPATIENT
Start: 2018-05-07 | End: 2018-05-08 | Stop reason: HOSPADM

## 2018-05-07 RX ORDER — GLYCOPYRROLATE 0.2 MG/ML
INJECTION INTRAMUSCULAR; INTRAVENOUS AS NEEDED
Status: DISCONTINUED | OUTPATIENT
Start: 2018-05-07 | End: 2018-05-07 | Stop reason: SURG

## 2018-05-07 RX ORDER — IBUPROFEN 200 MG
400 TABLET ORAL EVERY 6 HOURS PRN
Status: DISCONTINUED | OUTPATIENT
Start: 2018-05-07 | End: 2018-05-08 | Stop reason: HOSPADM

## 2018-05-07 RX ORDER — SODIUM CHLORIDE 0.9 % (FLUSH) 0.9 %
1-10 SYRINGE (ML) INJECTION AS NEEDED
Status: DISCONTINUED | OUTPATIENT
Start: 2018-05-07 | End: 2018-05-07 | Stop reason: HOSPADM

## 2018-05-07 RX ORDER — DEXAMETHASONE SODIUM PHOSPHATE 4 MG/ML
INJECTION, SOLUTION INTRA-ARTICULAR; INTRALESIONAL; INTRAMUSCULAR; INTRAVENOUS; SOFT TISSUE AS NEEDED
Status: DISCONTINUED | OUTPATIENT
Start: 2018-05-07 | End: 2018-05-07 | Stop reason: SURG

## 2018-05-07 RX ORDER — LIDOCAINE HYDROCHLORIDE 20 MG/ML
INJECTION, SOLUTION INFILTRATION; PERINEURAL AS NEEDED
Status: DISCONTINUED | OUTPATIENT
Start: 2018-05-07 | End: 2018-05-07 | Stop reason: SURG

## 2018-05-07 RX ORDER — VASOPRESSIN 20 U/ML
INJECTION PARENTERAL AS NEEDED
Status: DISCONTINUED | OUTPATIENT
Start: 2018-05-07 | End: 2018-05-07 | Stop reason: HOSPADM

## 2018-05-07 RX ORDER — CALCIUM CARBONATE 200(500)MG
2 TABLET,CHEWABLE ORAL 3 TIMES DAILY PRN
Status: DISCONTINUED | OUTPATIENT
Start: 2018-05-07 | End: 2018-05-08 | Stop reason: HOSPADM

## 2018-05-07 RX ORDER — DIPHENHYDRAMINE HYDROCHLORIDE 50 MG/ML
25 INJECTION INTRAMUSCULAR; INTRAVENOUS NIGHTLY PRN
Status: DISCONTINUED | OUTPATIENT
Start: 2018-05-07 | End: 2018-05-08 | Stop reason: HOSPADM

## 2018-05-07 RX ORDER — FENTANYL CITRATE 50 UG/ML
INJECTION, SOLUTION INTRAMUSCULAR; INTRAVENOUS AS NEEDED
Status: DISCONTINUED | OUTPATIENT
Start: 2018-05-07 | End: 2018-05-07 | Stop reason: SURG

## 2018-05-07 RX ORDER — ONDANSETRON 2 MG/ML
4 INJECTION INTRAMUSCULAR; INTRAVENOUS EVERY 6 HOURS PRN
Status: DISCONTINUED | OUTPATIENT
Start: 2018-05-07 | End: 2018-05-08 | Stop reason: HOSPADM

## 2018-05-07 RX ORDER — SUFENTANIL CITRATE 50 UG/ML
INJECTION EPIDURAL; INTRAVENOUS AS NEEDED
Status: DISCONTINUED | OUTPATIENT
Start: 2018-05-07 | End: 2018-05-07 | Stop reason: SURG

## 2018-05-07 RX ORDER — DEXTROSE MONOHYDRATE 25 G/50ML
12.5 INJECTION, SOLUTION INTRAVENOUS AS NEEDED
Status: DISCONTINUED | OUTPATIENT
Start: 2018-05-07 | End: 2018-05-07 | Stop reason: HOSPADM

## 2018-05-07 RX ORDER — MIDAZOLAM HYDROCHLORIDE 1 MG/ML
2 INJECTION INTRAMUSCULAR; INTRAVENOUS
Status: DISCONTINUED | OUTPATIENT
Start: 2018-05-07 | End: 2018-05-07 | Stop reason: HOSPADM

## 2018-05-07 RX ORDER — SUCCINYLCHOLINE CHLORIDE 20 MG/ML
INJECTION INTRAMUSCULAR; INTRAVENOUS AS NEEDED
Status: DISCONTINUED | OUTPATIENT
Start: 2018-05-07 | End: 2018-05-07 | Stop reason: SURG

## 2018-05-07 RX ORDER — KETOROLAC TROMETHAMINE 30 MG/ML
30 INJECTION, SOLUTION INTRAMUSCULAR; INTRAVENOUS EVERY 6 HOURS PRN
Status: COMPLETED | OUTPATIENT
Start: 2018-05-07 | End: 2018-05-08

## 2018-05-07 RX ORDER — MORPHINE SULFATE 2 MG/ML
2 INJECTION, SOLUTION INTRAMUSCULAR; INTRAVENOUS
Status: DISCONTINUED | OUTPATIENT
Start: 2018-05-07 | End: 2018-05-07 | Stop reason: HOSPADM

## 2018-05-07 RX ORDER — SODIUM CHLORIDE, SODIUM LACTATE, POTASSIUM CHLORIDE, CALCIUM CHLORIDE 600; 310; 30; 20 MG/100ML; MG/100ML; MG/100ML; MG/100ML
9 INJECTION, SOLUTION INTRAVENOUS CONTINUOUS
Status: DISCONTINUED | OUTPATIENT
Start: 2018-05-07 | End: 2018-05-07 | Stop reason: HOSPADM

## 2018-05-07 RX ORDER — DEXAMETHASONE SODIUM PHOSPHATE 4 MG/ML
4 INJECTION, SOLUTION INTRA-ARTICULAR; INTRALESIONAL; INTRAMUSCULAR; INTRAVENOUS; SOFT TISSUE ONCE AS NEEDED
Status: COMPLETED | OUTPATIENT
Start: 2018-05-07 | End: 2018-05-07

## 2018-05-07 RX ORDER — DIPHENHYDRAMINE HCL 25 MG
25 CAPSULE ORAL NIGHTLY PRN
Status: DISCONTINUED | OUTPATIENT
Start: 2018-05-07 | End: 2018-05-08 | Stop reason: HOSPADM

## 2018-05-07 RX ORDER — FAMOTIDINE 10 MG/ML
20 INJECTION, SOLUTION INTRAVENOUS
Status: DISCONTINUED | OUTPATIENT
Start: 2018-05-07 | End: 2018-05-07 | Stop reason: HOSPADM

## 2018-05-07 RX ORDER — FENTANYL CITRATE 50 UG/ML
25 INJECTION, SOLUTION INTRAMUSCULAR; INTRAVENOUS
Status: DISCONTINUED | OUTPATIENT
Start: 2018-05-07 | End: 2018-05-07 | Stop reason: HOSPADM

## 2018-05-07 RX ORDER — METOPROLOL TARTRATE 5 MG/5ML
2.5 INJECTION INTRAVENOUS
Status: DISCONTINUED | OUTPATIENT
Start: 2018-05-07 | End: 2018-05-07 | Stop reason: HOSPADM

## 2018-05-07 RX ORDER — ONDANSETRON 4 MG/1
4 TABLET, ORALLY DISINTEGRATING ORAL EVERY 6 HOURS PRN
Status: DISCONTINUED | OUTPATIENT
Start: 2018-05-07 | End: 2018-05-08 | Stop reason: HOSPADM

## 2018-05-07 RX ORDER — MEPERIDINE HYDROCHLORIDE 25 MG/ML
12.5 INJECTION INTRAMUSCULAR; INTRAVENOUS; SUBCUTANEOUS
Status: DISCONTINUED | OUTPATIENT
Start: 2018-05-07 | End: 2018-05-07 | Stop reason: HOSPADM

## 2018-05-07 RX ORDER — DIPHENHYDRAMINE HYDROCHLORIDE 50 MG/ML
12.5 INJECTION INTRAMUSCULAR; INTRAVENOUS
Status: DISCONTINUED | OUTPATIENT
Start: 2018-05-07 | End: 2018-05-07 | Stop reason: HOSPADM

## 2018-05-07 RX ORDER — SODIUM CHLORIDE, SODIUM LACTATE, POTASSIUM CHLORIDE, CALCIUM CHLORIDE 600; 310; 30; 20 MG/100ML; MG/100ML; MG/100ML; MG/100ML
30 INJECTION, SOLUTION INTRAVENOUS CONTINUOUS
Status: DISCONTINUED | OUTPATIENT
Start: 2018-05-07 | End: 2018-05-07 | Stop reason: HOSPADM

## 2018-05-07 RX ORDER — HYDRALAZINE HYDROCHLORIDE 20 MG/ML
5 INJECTION INTRAMUSCULAR; INTRAVENOUS
Status: DISCONTINUED | OUTPATIENT
Start: 2018-05-07 | End: 2018-05-07 | Stop reason: HOSPADM

## 2018-05-07 RX ORDER — PHENAZOPYRIDINE HYDROCHLORIDE 200 MG/1
200 TABLET, FILM COATED ORAL ONCE
Status: COMPLETED | OUTPATIENT
Start: 2018-05-07 | End: 2018-05-07

## 2018-05-07 RX ORDER — IPRATROPIUM BROMIDE AND ALBUTEROL SULFATE 2.5; .5 MG/3ML; MG/3ML
3 SOLUTION RESPIRATORY (INHALATION) ONCE AS NEEDED
Status: DISCONTINUED | OUTPATIENT
Start: 2018-05-07 | End: 2018-05-07 | Stop reason: HOSPADM

## 2018-05-07 RX ORDER — PROPOFOL 10 MG/ML
VIAL (ML) INTRAVENOUS AS NEEDED
Status: DISCONTINUED | OUTPATIENT
Start: 2018-05-07 | End: 2018-05-07 | Stop reason: SURG

## 2018-05-07 RX ORDER — SODIUM CHLORIDE 0.9 % (FLUSH) 0.9 %
3 SYRINGE (ML) INJECTION AS NEEDED
Status: DISCONTINUED | OUTPATIENT
Start: 2018-05-07 | End: 2018-05-07 | Stop reason: HOSPADM

## 2018-05-07 RX ORDER — OXYCODONE AND ACETAMINOPHEN 10; 325 MG/1; MG/1
1 TABLET ORAL ONCE AS NEEDED
Status: COMPLETED | OUTPATIENT
Start: 2018-05-07 | End: 2018-05-07

## 2018-05-07 RX ORDER — SCOLOPAMINE TRANSDERMAL SYSTEM 1 MG/1
1 PATCH, EXTENDED RELEASE TRANSDERMAL ONCE
Status: DISCONTINUED | OUTPATIENT
Start: 2018-05-07 | End: 2018-05-07

## 2018-05-07 RX ORDER — SIMETHICONE 80 MG
80 TABLET,CHEWABLE ORAL 4 TIMES DAILY PRN
Status: DISCONTINUED | OUTPATIENT
Start: 2018-05-07 | End: 2018-05-08 | Stop reason: HOSPADM

## 2018-05-07 RX ORDER — MAGNESIUM HYDROXIDE 1200 MG/15ML
LIQUID ORAL AS NEEDED
Status: DISCONTINUED | OUTPATIENT
Start: 2018-05-07 | End: 2018-05-07 | Stop reason: HOSPADM

## 2018-05-07 RX ORDER — LABETALOL HYDROCHLORIDE 5 MG/ML
5 INJECTION, SOLUTION INTRAVENOUS
Status: DISCONTINUED | OUTPATIENT
Start: 2018-05-07 | End: 2018-05-07 | Stop reason: HOSPADM

## 2018-05-07 RX ORDER — ROCURONIUM BROMIDE 10 MG/ML
INJECTION, SOLUTION INTRAVENOUS AS NEEDED
Status: DISCONTINUED | OUTPATIENT
Start: 2018-05-07 | End: 2018-05-07 | Stop reason: SURG

## 2018-05-07 RX ORDER — ONDANSETRON 2 MG/ML
INJECTION INTRAMUSCULAR; INTRAVENOUS AS NEEDED
Status: DISCONTINUED | OUTPATIENT
Start: 2018-05-07 | End: 2018-05-07 | Stop reason: SURG

## 2018-05-07 RX ORDER — OXYCODONE HYDROCHLORIDE AND ACETAMINOPHEN 5; 325 MG/1; MG/1
1 TABLET ORAL EVERY 4 HOURS PRN
Status: DISCONTINUED | OUTPATIENT
Start: 2018-05-07 | End: 2018-05-08 | Stop reason: HOSPADM

## 2018-05-07 RX ORDER — SODIUM CHLORIDE AND POTASSIUM CHLORIDE 150; 900 MG/100ML; MG/100ML
100 INJECTION, SOLUTION INTRAVENOUS CONTINUOUS
Status: DISCONTINUED | OUTPATIENT
Start: 2018-05-07 | End: 2018-05-08 | Stop reason: HOSPADM

## 2018-05-07 RX ORDER — NALOXONE HCL 0.4 MG/ML
0.4 VIAL (ML) INJECTION AS NEEDED
Status: DISCONTINUED | OUTPATIENT
Start: 2018-05-07 | End: 2018-05-07 | Stop reason: HOSPADM

## 2018-05-07 RX ORDER — MIDAZOLAM HYDROCHLORIDE 1 MG/ML
1 INJECTION INTRAMUSCULAR; INTRAVENOUS
Status: DISCONTINUED | OUTPATIENT
Start: 2018-05-07 | End: 2018-05-07 | Stop reason: HOSPADM

## 2018-05-07 RX ADMIN — CEFAZOLIN 2 G: 1 INJECTION, POWDER, FOR SOLUTION INTRAMUSCULAR; INTRAVENOUS at 18:05

## 2018-05-07 RX ADMIN — KETOROLAC TROMETHAMINE 30 MG: 30 INJECTION, SOLUTION INTRAMUSCULAR at 13:29

## 2018-05-07 RX ADMIN — SUFENTANIL CITRATE 20 MCG: 50 INJECTION, SOLUTION EPIDURAL; INTRAVENOUS at 08:40

## 2018-05-07 RX ADMIN — DEXAMETHASONE SODIUM PHOSPHATE 8 MG: 4 INJECTION, SOLUTION INTRAMUSCULAR; INTRAVENOUS at 10:10

## 2018-05-07 RX ADMIN — GLYCOPYRROLATE 0.3 MG: 0.2 INJECTION, SOLUTION INTRAMUSCULAR; INTRAVENOUS at 11:03

## 2018-05-07 RX ADMIN — SODIUM CHLORIDE, POTASSIUM CHLORIDE, SODIUM LACTATE AND CALCIUM CHLORIDE 1000 ML: 600; 310; 30; 20 INJECTION, SOLUTION INTRAVENOUS at 07:55

## 2018-05-07 RX ADMIN — SUCCINYLCHOLINE CHLORIDE 140 MG: 20 INJECTION, SOLUTION INTRAMUSCULAR; INTRAVENOUS at 08:40

## 2018-05-07 RX ADMIN — ONDANSETRON 4 MG: 2 INJECTION, SOLUTION INTRAMUSCULAR; INTRAVENOUS at 11:43

## 2018-05-07 RX ADMIN — FAMOTIDINE 20 MG: 10 INJECTION INTRAVENOUS at 08:22

## 2018-05-07 RX ADMIN — OXYCODONE HYDROCHLORIDE AND ACETAMINOPHEN 1 TABLET: 10; 325 TABLET ORAL at 20:08

## 2018-05-07 RX ADMIN — LIDOCAINE HYDROCHLORIDE 1 EACH: 40 SOLUTION TOPICAL at 08:40

## 2018-05-07 RX ADMIN — PROPOFOL 160 MG: 10 INJECTION, EMULSION INTRAVENOUS at 08:40

## 2018-05-07 RX ADMIN — PHENAZOPYRIDINE HYDROCHLORIDE 200 MG: 200 TABLET, FILM COATED ORAL at 07:51

## 2018-05-07 RX ADMIN — SCOPOLAMINE 1 PATCH: 1 PATCH, EXTENDED RELEASE TRANSDERMAL at 08:22

## 2018-05-07 RX ADMIN — SODIUM CHLORIDE, POTASSIUM CHLORIDE, SODIUM LACTATE AND CALCIUM CHLORIDE 30 ML/HR: 600; 310; 30; 20 INJECTION, SOLUTION INTRAVENOUS at 07:55

## 2018-05-07 RX ADMIN — SODIUM CHLORIDE, POTASSIUM CHLORIDE, SODIUM LACTATE AND CALCIUM CHLORIDE: 600; 310; 30; 20 INJECTION, SOLUTION INTRAVENOUS at 09:45

## 2018-05-07 RX ADMIN — ROCURONIUM BROMIDE 20 MG: 10 INJECTION INTRAVENOUS at 09:29

## 2018-05-07 RX ADMIN — POTASSIUM CHLORIDE AND SODIUM CHLORIDE 100 ML/HR: 900; 150 INJECTION, SOLUTION INTRAVENOUS at 15:24

## 2018-05-07 RX ADMIN — MEPERIDINE HYDROCHLORIDE 25 MG: 25 INJECTION INTRAMUSCULAR; INTRAVENOUS; SUBCUTANEOUS at 11:45

## 2018-05-07 RX ADMIN — SUFENTANIL CITRATE 20 MCG: 50 INJECTION, SOLUTION EPIDURAL; INTRAVENOUS at 09:03

## 2018-05-07 RX ADMIN — LIDOCAINE HYDROCHLORIDE 80 MG: 20 INJECTION, SOLUTION INFILTRATION; PERINEURAL at 08:40

## 2018-05-07 RX ADMIN — FENTANYL CITRATE 100 MCG: 50 INJECTION, SOLUTION INTRAMUSCULAR; INTRAVENOUS at 09:57

## 2018-05-07 RX ADMIN — KETOROLAC TROMETHAMINE 30 MG: 30 INJECTION, SOLUTION INTRAMUSCULAR at 19:53

## 2018-05-07 RX ADMIN — ROCURONIUM BROMIDE 5 MG: 10 INJECTION INTRAVENOUS at 08:40

## 2018-05-07 RX ADMIN — LIDOCAINE HYDROCHLORIDE 0.5 ML: 10 INJECTION, SOLUTION EPIDURAL; INFILTRATION; INTRACAUDAL; PERINEURAL at 07:54

## 2018-05-07 RX ADMIN — Medication 2 G: at 08:46

## 2018-05-07 RX ADMIN — SUFENTANIL CITRATE 10 MCG: 50 INJECTION, SOLUTION EPIDURAL; INTRAVENOUS at 09:05

## 2018-05-07 RX ADMIN — DEXAMETHASONE SODIUM PHOSPHATE 4 MG: 4 INJECTION, SOLUTION INTRA-ARTICULAR; INTRALESIONAL; INTRAMUSCULAR; INTRAVENOUS; SOFT TISSUE at 08:22

## 2018-05-07 RX ADMIN — MIDAZOLAM HYDROCHLORIDE 2 MG: 1 INJECTION, SOLUTION INTRAMUSCULAR; INTRAVENOUS at 08:22

## 2018-05-07 RX ADMIN — ROCURONIUM BROMIDE 45 MG: 10 INJECTION INTRAVENOUS at 08:54

## 2018-05-07 RX ADMIN — OXYCODONE HYDROCHLORIDE AND ACETAMINOPHEN 1 TABLET: 10; 325 TABLET ORAL at 12:06

## 2018-05-07 RX ADMIN — OXYCODONE HYDROCHLORIDE AND ACETAMINOPHEN 1 TABLET: 10; 325 TABLET ORAL at 16:02

## 2018-05-07 RX ADMIN — ONDANSETRON HYDROCHLORIDE 4 MG: 2 SOLUTION INTRAMUSCULAR; INTRAVENOUS at 10:15

## 2018-05-07 RX ADMIN — Medication 4 MG: at 11:03

## 2018-05-07 NOTE — OP NOTE
[facility]    Emily Chou  [id number]  [id number]  5/7/2018      Hysterectomy Procedure & Posterior Repair Note      Pre-operative Diagnosis: Menorrhagia and Rectocele    Post-operative Diagnosis: same    Operation: Total Laparoscopic Hysterectomy with bilateral salpingectomy, da Jayy assisted, Cystoscopy and Rectocele repair    Surgeon: Deana Young MD, FACOG    Assistants: OR staff    Anesthesia: General endotracheal anesthesia, General    Findings: Normal pelvis and Rectocele    Estimated Blood Loss: 100 ml      Specimens: Uterus, Cervix and Bilateral fallopian tubes    Complications:  None    Disposition: PACU - hemodynamically stable.      Procedure Details    The patient was seen in the Holding Room. The risks, benefits, complications, treatment options, and expected outcomes were discussed with the patient. The patient concurred with the proposed plan, giving informed consent. The patient was identified as Emily Chou and the procedure verified as Robotic hysterectomy with bilateral salpingectomy. A Time Out was held and the above information confirmed.    After these above consents were obtained the patient was taken to the  operating room, where general anesthesia was administered. She was placed in  the dorsal lithotomy position with care taken placement of her legs to avoid  nerve injury. She was prepped and draped in the usual sterile fashion. Cho  catheter was inserted. A complete procedural timeout was completed to ensure  proper patient and proper procedure.    A supraumbilical skin incision  was made with a knife and the Veress needle was inserted carefully and without  difficulty. The abdomen insufflated to 20 mmHg. A number 10-12 bladeless trocar was inserted without difficulty and proper placement was confirmed with the 5 mm laparoscope.  Areas that were immediately  adjacent to entry were free of injury. Two additional 8 mm da Jayy trocars  were placed under direct  visualization and one 8 mm AirSeal trocar was placed under direct visualization without difficulty or complication or injury.  NeoClose sutures were then placed above and below the 10-12 site to aid in  fascial closure.    The patient was then placed in Trendelenburg position and a  Triangulate uterine manipulator, colpotomizer and vaginal occluder were inserted  under direct visualization. The robot was docked using a side docking  technique. Monopolar scissors were placed in arm #1 and bipolar PK instrument into  arm #2.  Examination of the pelvis showed the above findings. The ureters were  identified bilaterally.  Attention was then turned to the adnexa.  The fallopian tubes and ovaries were  from the uterus and cervix by cauterizing and then transecting the round ligament, fallopian tube, and utero-ovarian ligament on both the R and L sides..  This procedure was performed bilaterally, and then dissection proceeded in a stepwise fashion down both sides of the uterine body- beginning by transecting the round ligament, and taking care to stay close to the side of the uterus.  The uterine arteries were skeletonized bilaterally, sealed and transected.  Anteriorly, a bladder flap was created. The bladder was minimally adherent to the lower uterine segment and care was taken to properly gain access to the plain to adequately dissect the bladder off of the lower uterine segment and cervix. Colpotomy was begun and continued circumferentially around the cervix until complete. The specimen was removed vaginally. All pedicles were noted be hemostatic.      Surgical pedicles were inspected for hemostasis.  The vaginal cuff was closed with Stratafix double-armed, barbed suture beginning in the middle of the cuff and working toward each vaginal cuff angle.  Residual suture on each side was then used to reapproximate the peritoneum back toward the midline, effectively achieving a two layer closure, and incorporating the  uterosacral ligaments when appropriate.  The pelvis was copiously irrigated and suctioned and again hemostasis was noted even after desufflation.  Cassi was placed over all pedicles for added postoperative hemostatic benefits.  The vaginal cuff was then inspected vaginally using a bivalve speculum. Good approximation of the tissue was noted and no bleeding was encountered.    Attention was then turned to removal of the fallopian tubes.  Each fallopian tube was grasp with a Carrion-GeSigndat grasper by the patient-side assistant and placed under gentle traction while the tubes were dissected away from the ipsilateral ovary with the PK grasper and the monopolar scissors.  The tubes were withdrawn through the patient-side assist port.      The 70 degree cystoscope was used to inspect the bladder and all walls. All walls were normal without abnormality or evidence of injury. Bilateral ureteral patency was noted. At this point, the instruments were removed. The robot was undocked, insufflation was released and the trocars were removed.     A Walker retractor was placed anteriorly to aid with visualization, and the posterior introitus was grasped with Allis clamps it 5 and 7 o'clock.  The posterior vaginal wall was injected with Pitressin in injectable saline.  A scalpel was used to make a midline incision the entire length of the posterior vaginal wall.  The edges of this incision were grasped with Allis clamps.  Metzenbaum scissors were used to dissect the excess vaginal mucosa off the underlying vaginal wall.  3-0 Vicryl pop-off sutures were used to create a midline plication and reduce the patient's posterior wall defect.  Excess vaginal mucosa was trimmed from the edges of the incision with curved Desai scissors.  The resulting incision edges were reapproximated in a proximal to distal fashion using a 2-0 Vicryl swedged-on suture in a running locked fashion.    At the conclusion of the case the patient had good anterior  and posterior wall support.  The caliber of the vagina allowed 2 fingers, [id number], without any narrowing or stricturing at any point along the length of the vaginal canal.  The patient's vagina was filled with Premarin vaginal cream and vaginal packing material.  Sponge lap and needle counts were correct ×2.     The NeoClose sutures were secured and skin incisions were closed subcuticularly.  Sure Close was placed over each incision as a bandage.  The patient tolerated the procedure well. All instrument counts were correct. She was taken to the recovery room after extubation in stable condition.     Deana Young MD  5/7/2018  11:00 AM

## 2018-05-07 NOTE — ANESTHESIA PREPROCEDURE EVALUATION
Anesthesia Evaluation     Patient summary reviewed   no history of anesthetic complications:  NPO Solid Status: > 8 hours             Airway   Mallampati: I  TM distance: >3 FB  Neck ROM: full  Dental      Pulmonary    (-) asthma, sleep apnea, not a smoker  Cardiovascular   Exercise tolerance: excellent (>7 METS)    ECG reviewed    (-) pacemaker, past MI, angina, cardiac stents      Neuro/Psych  (-) seizures, TIA, CVA  GI/Hepatic/Renal/Endo    (+) obesity,     (-) GERD, liver disease, no renal disease, diabetes    Musculoskeletal     Abdominal    Substance History      OB/GYN          Other                        Anesthesia Plan    ASA 2     general     intravenous induction   Anesthetic plan and risks discussed with patient.

## 2018-05-07 NOTE — H&P (VIEW-ONLY)
"Attempted to obtain health maintenance information, patient unable to provide answers for the following items Tdap and annual physical.      [facility]  Emily Chou  : 1970  MRN: [id number]  CSN: [id number]    History and Physical    Subjective   Emily Chou is a 47 y.o. year old  who presents for consultation about surgery due to cramping with clotting, but also due to both fecal and urinary incontinence.    Patient reports that periods are regular, every month.  Bleeding is heavy and cramps incapacitating for the first 3 days; bleeding lasts about 7 days.  Patient not currently sexually active, but her last partner had had a vasectomy.    Bladder leakage occurs with any physical exertion - running, coughing, sneezing.  She had a bladder mesh placed by Dr. Giles in [place] and then had it \"revised\" 11 days later because it was too tight and she could not void.  Leakage now is about the same as it was before she ever had the surgery.  She does also report \"sometimes\" when asked about urge incontinence.    Fecal incontinence has been occurring for 2-3 years and progressively gotten worse.  It initially started because she would pass stool when she went to bathroom to void, but recently has been occurring with other activies - during yoga, getting out of a car, or while having sex.  Likely unrelated, the patient for years has had very rare deep rectal pain, that is breath-taking, but only lasts for a few minutes.  She had manomatry, endoanal u/s and a barium enema done last year when living in [place].  She was offered biofeedback training for incomplete emptying.    Past Medical History:   Diagnosis Date   • Functional bowel disorder    • Hypertension      Past Surgical History:   Procedure Laterality Date   • BLADDER SUSPENSION      Dr. Giles in [place]   • ENDOMETRIAL ABLATION       Smoking status: Never Smoker                                                              Smokeless tobacco: Never " "Used                          Current Outpatient Prescriptions:   •  cholecalciferol (VITAMIN D3) 1000 units tablet, Take 4,000 Units by mouth Daily., Disp: , Rfl:   •  HYDROcodone-acetaminophen (NORCO) 5-325 MG per tablet, Take 1 tablet by mouth Every 4 (Four) Hours As Needed for Severe Pain ., Disp: 8 tablet, Rfl: 0  •  ondansetron ODT (ZOFRAN-ODT) 4 MG disintegrating tablet, Take 1 tablet by mouth Every 6 (Six) Hours As Needed for Nausea or Vomiting., Disp: 12 tablet, Rfl: 0    Allergies   Allergen Reactions   • Ultram [Tramadol] Dizziness       Family History   Problem Relation Age of Onset   • Heart disease Father    • No Known Problems Mother    • No Known Problems Brother    • Heart disease Paternal Grandmother    • Heart disease Maternal Grandmother    • No Known Problems Brother    • Uterine cancer Other    • Breast cancer Neg Hx    • Ovarian cancer Neg Hx      Review of Systems   Respiratory: Negative for shortness of breath.    Cardiovascular: Negative for chest pain.   Gastrointestinal: Negative for abdominal pain.        Fecal incontinence for 1-2 years   Endocrine: Negative for cold intolerance and heat intolerance.   Genitourinary: Positive for enuresis (mixed RADHA and OAB) and menstrual problem (heavy, painful periods with clots). Negative for difficulty urinating, dyspareunia and vaginal bleeding.   Skin: Negative for rash.   Psychiatric/Behavioral: Negative for dysphoric mood. The patient is not nervous/anxious.          Objective   /100   Ht 162.6 cm (64\")   Wt 80.7 kg (178 lb)   LMP 04/16/2018 (Exact Date)   BMI 30.55 kg/m²      Physical Exam   Physical Exam   Constitutional: She is oriented to person, place, and time. She appears well-developed and well-nourished. No distress.   HENT:   Head: Normocephalic and atraumatic.   Pulmonary/Chest: Effort normal.   Abdominal: Soft. There is no tenderness.   Genitourinary: Uterus normal. Rectal exam shows anal tone abnormal (rectal tone less " than expected, but not sufficiently poor to explain all of her symptoms). Pelvic exam was performed with patient supine. There is no tenderness or lesion on the right labia. There is no tenderness or lesion on the left labia. Uterus is not enlarged and not tender. Cervix exhibits no motion tenderness, no discharge and no friability. Right adnexum displays no tenderness and no fullness. Left adnexum displays no tenderness and no fullness. No tenderness or bleeding in the vagina. No signs of injury around the vagina. No vaginal discharge found.   Genitourinary Comments: Grade III rectocele.  Good uterine support.  Vaginal mucosa unremarkable   Musculoskeletal: Normal range of motion.   Neurological: She is alert and oriented to person, place, and time.   Skin: Skin is warm and dry.   Psychiatric: She has a normal mood and affect. Her behavior is normal.   Nursing note and vitals reviewed.      Labs  Lab Results   Component Value Date     03/28/2018    HGB 14.7 03/28/2018    HCT 44.1 03/28/2018    WBC 11.73 (H) 03/28/2018     03/28/2018    K 4.3 03/28/2018    CL 98 03/28/2018    CO2 27.0 03/28/2018    BUN 16 03/28/2018    CREATININE 1.01 03/28/2018    GLUCOSE 96 03/28/2018    ALBUMIN 4.50 03/28/2018    CALCIUM 9.7 03/28/2018    AST 26 03/28/2018    ALT 20 03/28/2018    BILITOT 0.3 03/28/2018        Assessment & Plan    Emily was seen today for menorrhagia.    Diagnoses and all orders for this visit:    Menorrhagia with regular cycle: Patient reports heavy painful periods which occur at regular intervals, but often included accidents.  Her bleeding is dysfunctional despite a history of endometrial ablation in the past.  The patient now desires definitive therapy in the form of a total laparoscopic hysterectomy.  We have discussed bilateral salpingectomy for reduction of ovarian cancer risk    The pros and cons of ovarian conservation were reviewed, and the patient would like to leave her ovaries in situ.   Risks of laparoscopic surgery were reviewed to include, but are not limited to, the possibility of bowel perforation requiring possible bowel resection and/or colostomy, possible bladder injury or possible and possible vascular injury which could require the need for a blood transfusion.  Possible need for conversion to a laparotomy was also discussed.  The patient's questions were answered to her satisfaction.  Post-op restrictions and typical post-op course were also reviewed.  Patient being scheduled for surgery on May 7; her posterior repair will be performed at the same time.  -     Case Request; Standing  -     CBC and Differential; Future  -     ECG 12 Lead; Future  -     phenazopyridine (PYRIDIUM) tablet 200 mg; Take 2 tablets by mouth 1 (One) Time.  -     ceFAZolin (ANCEF) 2 g in sodium chloride 0.9 % 100 mL IVPB; Infuse 2 g into a venous catheter Every 8 (Eight) Hours.  -     Case Request    Urinary incontinence, mixed: The patient has a history of mid urethral sling placed by her previous gynecologist, with revision/transection of the mesh just 3 weeks postop due to urinary retention.  The patient has been advised that a new mid urethral sling could be placed, or that pelvic physical therapy would also be helpful for her stress urinary incontinence.  The patient does also have a component of overactivity-associated incontinence, and we have answered all the patient's questions about InterStim in regard to this.  The patient was given the option of pursuing hysterectomy and rectocele repair first, or doing an in office trial of InterStim first; the patient has chosen to pursue hysterectomy first since I really feel like stool trapping in a rectocele is the most significant component of her fecal incontinence.      Incontinence of feces, unspecified fecal incontinence type:  As discussed above, and least 20-25 minutes of this visit was spent just discussing InterStim.  We have reviewed an in office  temporary trial of the device which could be worn for 4-5 days.  The patient understands that the purpose of InterStim is to increase poor rectal tone.  Since the patient also has a significant rectocele with stool trapping, we are going to surgically address that first, and potentially do a trial of InterStim afterward if she is still symptomatically.      Nonsmoker    BMI 30.0-30.9,adult    [place]-Walker grade 3 rectocele    S/P endometrial ablation    Other orders  -     Follow Anesthesia Guidelines / Standing Orders; Future  -     Follow Anesthesia Guidelines / Standing Orders; Standing  -     Type & Screen; Standing  -     Outpatient In A Bed; Standing  -     Obtain informed consent; Standing  -     Place sequential compression device; Standing        Deana Young MD  4/26/2018  11:04 AM

## 2018-05-07 NOTE — INTERVAL H&P NOTE
H&P reviewed. The patient was examined and there are no changes to the H&P.  Questions answered about post-op restriction/activity.  She also had questions about bowel regimen after surgery.  No concerns about procedure, patient understands she will be discharged tomorrow.

## 2018-05-07 NOTE — PLAN OF CARE
Problem: Patient Care Overview  Goal: Plan of Care Review  Outcome: Ongoing (interventions implemented as appropriate)   05/07/18 5061   Coping/Psychosocial   Plan of Care Reviewed With patient;mother   Plan of Care Review   Progress improving   OTHER   Outcome Summary VSS; blood pressure elevated due to chronic HTN, tachycardic at times. Dermabond x 4 incisions; Vaginal packing intact with scant amount of serosanguineous drainage. Output good at 400mL. Percocet and toradol given for pain. Ancef Q8. SCDs on. IID right hand; IV left hand (saline with 20KCL) infusing @ 100. CBC in AM.     Goal: Individualization and Mutuality  Outcome: Ongoing (interventions implemented as appropriate)    Goal: Discharge Needs Assessment  Outcome: Ongoing (interventions implemented as appropriate)    Goal: Interprofessional Rounds/Family Conf  Outcome: Ongoing (interventions implemented as appropriate)      Problem: Hysterectomy (Adult)  Goal: Signs and Symptoms of Listed Potential Problems Will be Absent, Minimized or Managed (Hysterectomy)  Outcome: Ongoing (interventions implemented as appropriate)    Goal: Anesthesia/Sedation Recovery  Outcome: Ongoing (interventions implemented as appropriate)

## 2018-05-07 NOTE — ANESTHESIA POSTPROCEDURE EVALUATION
Patient: Emily Chou    Procedure Summary     Date:  05/07/18 Room / Location:  [facility] OR  /  [facility] OR    Anesthesia Start:  0833 Anesthesia Stop:  1116    Procedures:       TOTAL LAPAROSCOPIC HYSTERECTOMY BILATERAL SALPINGECTOMY WITH DAVINCI SI ROBOT, CYSTO (N/A Abdomen)      RECTOCELE (N/A Vagina) Diagnosis:       Menorrhagia with regular cycle      (Menorrhagia with regular cycle [N92.0])    Surgeon:  Deana Young MD Provider:  Cezar Chavez CRNA    Anesthesia Type:  general ASA Status:  2          Anesthesia Type: general  Last vitals  BP   146/84 (05/07/18 1230)   Temp   98 °F (36.7 °C) (05/07/18 1230)   Pulse   84 (05/07/18 1230)   Resp   12 (05/07/18 1230)     SpO2   97 % (05/07/18 1230)     Post Anesthesia Care and Evaluation    Patient location during evaluation: PACU  Patient participation: complete - patient participated  Level of consciousness: awake and alert  Pain management: adequate  Airway patency: patent  Anesthetic complications: No anesthetic complications  PONV Status: none  Cardiovascular status: acceptable and hemodynamically stable  Respiratory status: acceptable  Hydration status: acceptable    Comments: Blood pressure 146/84, pulse 84, temperature 98 °F (36.7 °C), resp. rate 12, last menstrual period 04/16/2018, SpO2 97 %, not currently breastfeeding.    Patient discharged from PACU based upon Harjinder score. Please see RN notes for further details

## 2018-05-07 NOTE — ANESTHESIA PROCEDURE NOTES
Airway  Urgency: elective    Date/Time: 5/7/2018 8:42 AM  Airway not difficult    General Information and Staff    Patient location during procedure: OR  CRNA: EFREN GARZA    Indications and Patient Condition  Indications for airway management: airway protection    Preoxygenated: yes  Mask difficulty assessment: 1 - vent by mask    Final Airway Details  Final airway type: endotracheal airway      Successful airway: ETT  Cuffed: yes   Successful intubation technique: direct laryngoscopy  Endotracheal tube insertion site: oral  Blade: Alice  Blade size: #3  ETT size: 7.5 mm  Cormack-Lehane Classification: grade I - full view of glottis  Placement verified by: chest auscultation and capnometry   Measured from: lips  ETT to lips (cm): 22  Number of attempts at approach: 1

## 2018-05-08 VITALS
BODY MASS INDEX: 31.99 KG/M2 | SYSTOLIC BLOOD PRESSURE: 120 MMHG | DIASTOLIC BLOOD PRESSURE: 68 MMHG | HEIGHT: 63 IN | WEIGHT: 180.56 LBS | HEART RATE: 70 BPM | TEMPERATURE: 99.4 F | RESPIRATION RATE: 18 BRPM | OXYGEN SATURATION: 100 %

## 2018-05-08 PROBLEM — N92.0 MENORRHAGIA WITH REGULAR CYCLE: Status: RESOLVED | Noted: 2018-04-26 | Resolved: 2018-05-08

## 2018-05-08 LAB
DEPRECATED RDW RBC AUTO: 38.9 FL (ref 40–54)
ERYTHROCYTE [DISTWIDTH] IN BLOOD BY AUTOMATED COUNT: 12.1 % (ref 12–15)
HCT VFR BLD AUTO: 33.1 % (ref 37–47)
HGB BLD-MCNC: 11.4 G/DL (ref 12–16)
MCH RBC QN AUTO: 30.3 PG (ref 28–32)
MCHC RBC AUTO-ENTMCNC: 34.4 G/DL (ref 33–36)
MCV RBC AUTO: 88 FL (ref 82–98)
PLATELET # BLD AUTO: 248 10*3/MM3 (ref 130–400)
PMV BLD AUTO: 9.9 FL (ref 6–12)
RBC # BLD AUTO: 3.76 10*6/MM3 (ref 4.2–5.4)
WBC NRBC COR # BLD: 20.44 10*3/MM3 (ref 4.8–10.8)

## 2018-05-08 PROCEDURE — 25010000002 KETOROLAC TROMETHAMINE PER 15 MG: Performed by: OBSTETRICS & GYNECOLOGY

## 2018-05-08 PROCEDURE — 85027 COMPLETE CBC AUTOMATED: CPT | Performed by: OBSTETRICS & GYNECOLOGY

## 2018-05-08 PROCEDURE — 94799 UNLISTED PULMONARY SVC/PX: CPT

## 2018-05-08 PROCEDURE — 25010000003 CEFAZOLIN PER 500 MG: Performed by: OBSTETRICS & GYNECOLOGY

## 2018-05-08 PROCEDURE — 25810000003 SODIUM CHLORIDE 0.9 % WITH KCL 20 MEQ 20-0.9 MEQ/L-% SOLUTION: Performed by: OBSTETRICS & GYNECOLOGY

## 2018-05-08 RX ORDER — LACTULOSE 20 G/30ML
20 SOLUTION ORAL 2 TIMES DAILY
Qty: 946 ML | Refills: 1 | Status: SHIPPED | OUTPATIENT
Start: 2018-05-08 | End: 2019-02-14

## 2018-05-08 RX ORDER — OXYCODONE HYDROCHLORIDE AND ACETAMINOPHEN 5; 325 MG/1; MG/1
1-2 TABLET ORAL EVERY 4 HOURS PRN
Qty: 40 TABLET | Refills: 0 | Status: SHIPPED | OUTPATIENT
Start: 2018-05-08 | End: 2018-06-15

## 2018-05-08 RX ADMIN — KETOROLAC TROMETHAMINE 30 MG: 30 INJECTION, SOLUTION INTRAMUSCULAR at 01:49

## 2018-05-08 RX ADMIN — OXYCODONE HYDROCHLORIDE AND ACETAMINOPHEN 1 TABLET: 10; 325 TABLET ORAL at 04:48

## 2018-05-08 RX ADMIN — OXYCODONE HYDROCHLORIDE AND ACETAMINOPHEN 1 TABLET: 10; 325 TABLET ORAL at 09:11

## 2018-05-08 RX ADMIN — KETOROLAC TROMETHAMINE 30 MG: 30 INJECTION, SOLUTION INTRAMUSCULAR at 08:02

## 2018-05-08 RX ADMIN — POTASSIUM CHLORIDE AND SODIUM CHLORIDE 100 ML/HR: 900; 150 INJECTION, SOLUTION INTRAVENOUS at 01:04

## 2018-05-08 RX ADMIN — OXYCODONE HYDROCHLORIDE AND ACETAMINOPHEN 1 TABLET: 10; 325 TABLET ORAL at 00:35

## 2018-05-08 RX ADMIN — CEFAZOLIN 2 G: 1 INJECTION, POWDER, FOR SOLUTION INTRAMUSCULAR; INTRAVENOUS at 00:34

## 2018-05-08 NOTE — DISCHARGE SUMMARY
[facility]  Emily Chou  : 1970  MRN: [id number]  CSN: [id number]    Discharge Summary      Date of Admission: 2018   Date of Discharge: 2018   Discharge Diagnosis: 1. Menorrhagia  2. Rectocele   Procedures Performed: Procedure(s):  TOTAL LAPAROSCOPIC HYSTERECTOMY BILATERAL SALPINGECTOMY WITH DAVINCI SI ROBOT, CYSTO  RECTOCELE      Consults: none   Brief History: Patient is a 47 y.o.who presented for scheduled surgical correction.   Hospital Course: Uncomplicated.  Patient was ready for discharge on POD #1.   Pending Studies: None.     Condition at discharge: rapidly improving   Discharge Medications:    Your medication list      START taking these medications      Instructions Last Dose Given Next Dose Due   lactulose 20 GM/30ML solution solution      Take 30 mL by mouth 2 (Two) Times a Day. Titrate as needed       oxyCODONE-acetaminophen 5-325 MG per tablet  Commonly known as:  PERCOCET      Take 1-2 tablets by mouth Every 4 (Four) Hours As Needed for Moderate Pain  or Severe Pain .          CONTINUE taking these medications      Instructions Last Dose Given Next Dose Due   cholecalciferol 1000 units tablet  Commonly known as:  VITAMIN D3      Take 4,000 Units by mouth Daily.       ondansetron ODT 4 MG disintegrating tablet  Commonly known as:  ZOFRAN-ODT      Take 1 tablet by mouth Every 6 (Six) Hours As Needed for Nausea or Vomiting.          STOP taking these medications    HYDROcodone-acetaminophen 5-325 MG per tablet  Commonly known as:  NORCO              Where to Get Your Medications      You can get these medications from any pharmacy    Bring a paper prescription for each of these medications   lactulose 20 GM/30ML solution solution   oxyCODONE-acetaminophen 5-325 MG per tablet        Discharge Disposition: home   Follow-up: Future Appointments  Date Time Provider Department Center   2018 8:30 AM Deana Young MD [facility] None            This note has been electronically  signed.    Deana Young MD  May 8, 2018  7:30 AM

## 2018-05-08 NOTE — PLAN OF CARE
Problem: Patient Care Overview  Goal: Plan of Care Review  Outcome: Ongoing (interventions implemented as appropriate)   05/08/18 0351   Coping/Psychosocial   Plan of Care Reviewed With patient   Plan of Care Review   Progress improving   OTHER   Outcome Summary vitals stable, blood pressure stable, derma [employer] x 4 intact. vaginal packing inplace with small amount of serosanguinous drainage noted. barbosa in place, good amount of urine output. percocet and toradol givent for pain. pt ambulated in [place] with assist of nurse. iv infusing, scd's on at htis time.      Goal: Individualization and Mutuality  Outcome: Ongoing (interventions implemented as appropriate)   05/07/18 1831 05/08/18 0351   Individualization   Patient Specific Preferences To keep pain controlled at a 3/10 or better.  --    Patient Specific Goals (Include Timeframe) Remain at a pain level of 3/10 or better through the night and get some rest.  --    Patient Specific Interventions --  pain medicaion given when due to help keep pain under control. warm blankets given.    Mutuality/Individual Preferences   What Anxieties, Fears, Concerns, or Questions Do You Have About Your Care? --  pain gurmeet get out of control    Mutuality/Individual Preferences   How to Address Anxieties/Fears --  explain care.      Goal: Discharge Needs Assessment  Outcome: Ongoing (interventions implemented as appropriate)   05/08/18 0351   Discharge Needs Assessment   Readmission Within the Last 30 Days no previous admission in last 30 days   Concerns to be Addressed no discharge needs identified   Patient/Family Anticipates Transition to home with family   Patient/Family Anticipated Services at Transition none   Transportation Concerns car, none   Transportation Anticipated family or friend will provide   Anticipated Changes Related to Illness none   Equipment Needed After Discharge none   Disability   Equipment Currently Used at Home none     Goal: Interprofessional  Rounds/Family Conf  Outcome: Ongoing (interventions implemented as appropriate)   05/08/18 0351   Interdisciplinary Rounds/Family Conf   Participants family;nursing;patient;physician       Problem: Hysterectomy (Adult)  Goal: Signs and Symptoms of Listed Potential Problems Will be Absent, Minimized or Managed (Hysterectomy)  Outcome: Ongoing (interventions implemented as appropriate)   05/08/18 0351   Goal/Outcome Evaluation   Problems Assessed (Hysterectomy) all   Problems Present (Hysterectomy) pain     Goal: Anesthesia/Sedation Recovery  Outcome: Ongoing (interventions implemented as appropriate)   05/08/18 0351   Goal/Outcome Evaluation   Anesthesia/Sedation Recovery recovered to baseline

## 2018-05-08 NOTE — PROGRESS NOTES
Bethany Chou  : 1970  MRN: [id number]  CSN: [id number]    Post-operative Day #1  Subjective   Medications being utilized for pain control: prescription NSAID's including ketorolac (Toradol) and narcotic analgesics including oxycodone/acetaminophen (Percocet, Tylox).  Patient reports pain is well-controlled.  She is passing gas and has not had a bowel movement.  Patient is having scant staining.  Her barbosa catheter +/- vaginal packing have already been removed.  She has not yet needed to void.         Objective     Min/max vitals past 24 hours:   Temp  Min: 97.6 °F (36.4 °C)  Max: 98.7 °F (37.1 °C)  BP  Min: 121/73  Max: 172/81  Pulse  Min: 64  Max: 97  Resp  Min: 10  Max: 18        I/O last 3 completed shifts:  In: 2620 [P.O.:720; I.V.:1900]  Out: 3025 [Urine:2925; Blood:100]    General: well developed; well nourished  no acute distress   Lungs:  Abdomen: breathing is unlabored  soft, non-tender; no masses  incision is clean   Pelvic: Not performed   Ext: Calves NT     WBC   Date/Time Value Ref Range Status   2018 20.44 (H) 4.80 - 10.80 10*3/mm3 Final     Hemoglobin   Date/Time Value Ref Range Status   2018 11.4 (L) 12.0 - 16.0 g/dL Final     Hematocrit   Date/Time Value Ref Range Status   2018 33.1 (L) 37.0 - 47.0 % Final     Platelets   Date/Time Value Ref Range Status   2018 248 130 - 400 10*3/mm3 Final        Assessment   1. Post-op Day #1 S/P TLH with AL  2. Patient reports pain well-controlled     Plan   1. Discharge to home  2. Advised no tampons or intercourse for 6 weeks.  3. D/C questions all answered  4. Follow-up appointment in 2 week(s)    Deana Young MD  2018  7:23 AM

## 2018-05-15 LAB
CYTO UR: NORMAL
LAB AP CASE REPORT: NORMAL
Lab: NORMAL
PATH REPORT.FINAL DX SPEC: NORMAL
PATH REPORT.GROSS SPEC: NORMAL

## 2018-05-17 ENCOUNTER — OFFICE VISIT (OUTPATIENT)
Dept: OBSTETRICS AND GYNECOLOGY | Facility: CLINIC | Age: 48
End: 2018-05-17

## 2018-05-17 VITALS
DIASTOLIC BLOOD PRESSURE: 92 MMHG | SYSTOLIC BLOOD PRESSURE: 138 MMHG | WEIGHT: 171 LBS | BODY MASS INDEX: 29.19 KG/M2 | HEIGHT: 64 IN

## 2018-05-17 DIAGNOSIS — Z09 S/P GYNECOLOGICAL SURGERY, FOLLOW-UP EXAM: Primary | ICD-10-CM

## 2018-05-17 PROCEDURE — 99024 POSTOP FOLLOW-UP VISIT: CPT | Performed by: OBSTETRICS & GYNECOLOGY

## 2018-05-17 NOTE — PROGRESS NOTES
"Subjective   Chief Complaint   Patient presents with   • Post-op     pt here for 1 week and 3 day post op Davinci TLH bilateral salpingectomy cysto, posterior repair. pt c/o vaginal pain. pt says that if she is up on her feet a lot she will have to sit down or lay down. pt also c/o rectal pain. pt voices no other concerns.      Emily Chou is a 47 y.o. year old  presenting to be seen for her post-operative visit.  She had a bilateral salpingectomy, repair of rectocele (UT) and Da Jayy TLH 1 week 3 days ago.  Currently she reports abd/pelvic pain for only a few days after surgery, but is not having any pain at this time - just says that she is sore now.  She is not having any vaginal bleeding, but has a brownish discharge. The patient does have pain in rectum that gets better when she is off her feet.    No Additional Complaints Reported    The following portions of the patient's history were reviewed and updated as appropriate:current medications and allergies    Review of Systems   Constitutional: Negative for activity change and unexpected weight change.   Respiratory: Negative for shortness of breath.    Cardiovascular: Negative for chest pain.   Gastrointestinal: Positive for constipation (since surgery.  Using laxatives).        Patient does feel like bowels evacuate more-completely now   Genitourinary: Negative for difficulty urinating and enuresis.         Objective   /92   Ht 162.6 cm (64\")   Wt 77.6 kg (171 lb)   BMI 29.35 kg/m²     General:  well developed; well nourished  no acute distress   Abdomen: soft, non-tender; no masses  incision is healing   Pelvis: Clinical staff was present for exam  Vaginal:  normal pink mucosa without prolapse.  Incisions well-approximated, minimal tenderness on speculum exam          Assessment   1. Pt is 1 week s/p bilateral salpingectomy, repair of rectocele (UT) and Da Jayy TLH  2. Doing well     Plan   1. Continue precautions  2. RTO in 4 weeks    No " orders of the defined types were placed in this encounter.         This note was electronically signed.    Deana Young MD  May 17, 2018

## 2018-05-25 ENCOUNTER — TELEPHONE (OUTPATIENT)
Dept: OBSTETRICS AND GYNECOLOGY | Facility: CLINIC | Age: 48
End: 2018-05-25

## 2018-05-25 RX ORDER — FLUCONAZOLE 150 MG/1
150 TABLET ORAL EVERY OTHER DAY
Qty: 2 TABLET | Refills: 0 | Status: SHIPPED | OUTPATIENT
Start: 2018-05-25 | End: 2018-06-15

## 2018-05-25 NOTE — TELEPHONE ENCOUNTER
Pt believes she has a yeast infection she has some itching. She is a post op pt from surgery 5/7/18. We will send in diflucan for her to take and if not better she will call next week and return to clinic to be seen

## 2018-06-15 ENCOUNTER — OFFICE VISIT (OUTPATIENT)
Dept: OBSTETRICS AND GYNECOLOGY | Facility: CLINIC | Age: 48
End: 2018-06-15

## 2018-06-15 VITALS
SYSTOLIC BLOOD PRESSURE: 136 MMHG | WEIGHT: 172 LBS | DIASTOLIC BLOOD PRESSURE: 80 MMHG | BODY MASS INDEX: 29.37 KG/M2 | HEIGHT: 64 IN

## 2018-06-15 DIAGNOSIS — M62.89 PELVIC FLOOR DYSFUNCTION: Primary | ICD-10-CM

## 2018-06-15 DIAGNOSIS — Z09 S/P GYNECOLOGICAL SURGERY, FOLLOW-UP EXAM: ICD-10-CM

## 2018-06-15 DIAGNOSIS — N39.46 URINARY INCONTINENCE, MIXED: ICD-10-CM

## 2018-06-15 DIAGNOSIS — R15.9 INCONTINENCE OF FECES, UNSPECIFIED FECAL INCONTINENCE TYPE: ICD-10-CM

## 2018-06-15 PROCEDURE — 99024 POSTOP FOLLOW-UP VISIT: CPT | Performed by: OBSTETRICS & GYNECOLOGY

## 2018-06-15 NOTE — PROGRESS NOTES
"Subjective   Chief Complaint   Patient presents with   • Post-op     pt here today for 6 week post op davinci tlh bilateral salpingectomy, cysto. pt says she is feeling much better since having surgery. pt voices no other concerns.      Emily Chou is a 47 y.o. year old  presenting to be seen for her post-operative visit.  She had a bilateral salpingectomy and Da Jayy TLH 6 weeks ago.  Currently she reports that she is not having any pain at this time.  She is not having any vaginal bleeding, but does still have a tan discharge.  The patient has not been active enough to report how surgery affected her RADHA, but has only leaked one time with sneezing since surgery.  She has not had fecal incontinence since surgery, but has not been sexually active.      No Additional Complaints Reported    The following portions of the patient's history were reviewed and updated as appropriate:current medications and allergies    Review of Systems   Respiratory: Negative for shortness of breath.    Cardiovascular: Negative for chest pain.   Gastrointestinal: Negative for abdominal pain, constipation and diarrhea.   Genitourinary: Positive for vaginal discharge. Negative for pelvic pain, vaginal bleeding and vaginal pain.         Objective   /80   Ht 162.6 cm (64\")   Wt 78 kg (172 lb)   BMI 29.52 kg/m²     General:  well developed; well nourished  no acute distress   Abdomen: soft, non-tender; no masses  incision is healed   Pelvis: Clinical staff was present for exam  :  urethral meatus normal; Cuff well supported and healed.  Good posterior and anterior support.  Speculum exam non-tender.  Rectal exam without evidence of rectocele.          Assessment   1. Pt is 6 weeks s/p bilateral salpingectomy and Da Jayy TLH with RP  2. Doing well     Plan   1. Order for Pelvic PT, as had previously been planned before patient even had surgery.  RTO in 2 months.    No orders of the defined types were placed in this " encounter.         This note was electronically signed.    Deana Young MD  Ca 15, 2018

## 2019-02-14 ENCOUNTER — TELEPHONE (OUTPATIENT)
Dept: OBSTETRICS AND GYNECOLOGY | Facility: CLINIC | Age: 49
End: 2019-02-14

## 2019-02-14 ENCOUNTER — OFFICE VISIT (OUTPATIENT)
Dept: OBSTETRICS AND GYNECOLOGY | Facility: CLINIC | Age: 49
End: 2019-02-14

## 2019-02-14 VITALS
SYSTOLIC BLOOD PRESSURE: 130 MMHG | DIASTOLIC BLOOD PRESSURE: 84 MMHG | BODY MASS INDEX: 29.19 KG/M2 | HEIGHT: 64 IN | WEIGHT: 171 LBS

## 2019-02-14 DIAGNOSIS — R15.9 INCONTINENCE OF FECES, UNSPECIFIED FECAL INCONTINENCE TYPE: ICD-10-CM

## 2019-02-14 DIAGNOSIS — R32 URINARY INCONTINENCE, UNSPECIFIED TYPE: Primary | ICD-10-CM

## 2019-02-14 DIAGNOSIS — Z78.9 NONSMOKER: ICD-10-CM

## 2019-02-14 PROCEDURE — 99214 OFFICE O/P EST MOD 30 MIN: CPT | Performed by: OBSTETRICS & GYNECOLOGY

## 2019-02-14 RX ORDER — HYDROCHLOROTHIAZIDE 25 MG/1
25 TABLET ORAL DAILY
Refills: 2 | COMMUNITY
Start: 2019-02-01

## 2019-02-14 RX ORDER — SUMATRIPTAN 100 MG/1
TABLET, FILM COATED ORAL
Refills: 0 | COMMUNITY
Start: 2018-12-03

## 2019-02-14 RX ORDER — LOSARTAN POTASSIUM 25 MG/1
25 TABLET ORAL DAILY
COMMUNITY
End: 2021-03-24 | Stop reason: SDUPTHER

## 2019-02-14 NOTE — PROGRESS NOTES
"Subjective   Chief Complaint   Patient presents with   • incontinence     Pt having both bowel and urinary incontinence.     Emily Chou is a 48 y.o. year old .  Patient's last menstrual period was 2018 (exact date).  She presents to be seen because of persistent fecal and urinary incontinence.  She feels like both got better after her TOTAL LAPAROSCOPIC HYSTERECTOMY with AR/NC last May, but then started to get worse again in October or November.  Based on her history/answers, Emily appears to have mixed incontinence with some component of both RADHA and urge incontinence.    The following portions of the patient's history were reviewed and updated as appropriate:current medications and allergies    Social History    Tobacco Use      Smoking status: Never Smoker      Smokeless tobacco: Never Used    Review of Systems   Respiratory: Negative for shortness of breath.    Cardiovascular: Negative for chest pain.   Gastrointestinal: Negative for abdominal pain.        Fecal incontinence for 1-2 years   Endocrine: Negative for cold intolerance and heat intolerance.   Genitourinary: Positive for enuresis (mixed RADHA and OAB). Negative for difficulty urinating, dyspareunia, menstrual problem (s/p hysterectomy) and vaginal bleeding.        Also having fecal incontinence   Skin: Negative for rash.   Psychiatric/Behavioral: Negative for dysphoric mood. The patient is not nervous/anxious.          Objective   /84   Ht 162.6 cm (64\")   Wt 77.6 kg (171 lb)   LMP 2018 (Exact Date)   Breastfeeding? No   BMI 29.35 kg/m²     Physical Exam   Constitutional: She is oriented to person, place, and time. She appears well-developed and well-nourished. No distress.   HENT:   Head: Normocephalic and atraumatic.   Pulmonary/Chest: Effort normal.   Abdominal: Soft. There is no tenderness.   Genitourinary: Rectal exam shows anal tone abnormal (rectal tone less than expected, but not sufficiently poor to explain all " of her symptoms). Pelvic exam was performed with patient supine. There is no tenderness or lesion on the right labia. There is no tenderness or lesion on the left labia. Uterus is not enlarged and not tender. Cervix exhibits no motion tenderness, no discharge and no friability. Right adnexum displays no tenderness and no fullness. Left adnexum displays no tenderness and no fullness. No tenderness or bleeding in the vagina. No signs of injury around the vagina. No vaginal discharge found.   Genitourinary Comments: Grade I rectocele.  Grade I rectocele.  Good apical support.  Vaginal mucosa unremarkable   Musculoskeletal: Normal range of motion.   Neurological: She is alert and oriented to person, place, and time.   Skin: Skin is warm and dry.   Psychiatric: She has a normal mood and affect. Her behavior is normal.   Nursing note and vitals reviewed.      Lab Review   No data reviewed    Imaging   No data reviewed     Assessment & Plan    Emily was seen today for incontinence.    Diagnoses and all orders for this visit:    Urinary incontinence, unspecified type: Mixed RADHA and urge incontinence.  Discussed that there would need to be different therapies for distinct types of leakage.  Patient wants to try Interstim for OAB component and do physical therapy for the RADHA component.  Physical therapy ordered.  Will add patient to next Interstim trial date  -     Ambulatory Referral to Physical Therapy Evaluate and treat, Pelvic Floor    Incontinence of feces, unspecified fecal incontinence type: Previous rectal exam noted poor tone, but not enough to explain symptoms.  -     Ambulatory Referral to Physical Therapy Evaluate and treat, Pelvic Floor    BMI 29.0-29.9,adult    Nonsmoker      This note was electronically signed.    Deana Young MD  February 14, 2019  8:49 AM    Total time spent today with Emily  was 30 minutes (level 4).  Greater than 50% of the time was spent coordinating care, answering her questions and  counseling regarding pathophysiology of her presenting problem along with plans for any diagnositc work-up and treatment.

## 2019-02-26 ENCOUNTER — HOSPITAL ENCOUNTER (OUTPATIENT)
Dept: PHYSICAL THERAPY | Facility: HOSPITAL | Age: 49
Setting detail: THERAPIES SERIES
Discharge: HOME | End: 2019-02-26

## 2019-02-26 DIAGNOSIS — R53.1 WEAKNESS: ICD-10-CM

## 2019-02-26 DIAGNOSIS — N39.46 MIXED URINARY INCONTINENCE DUE TO FEMALE GENITAL PROLAPSE: ICD-10-CM

## 2019-02-26 DIAGNOSIS — N81.9 MIXED URINARY INCONTINENCE DUE TO FEMALE GENITAL PROLAPSE: ICD-10-CM

## 2019-02-26 DIAGNOSIS — R15.9 INCONTINENCE OF FECES, UNSPECIFIED FECAL INCONTINENCE TYPE: Primary | ICD-10-CM

## 2019-02-26 DIAGNOSIS — N81.6 RECTOCELE: ICD-10-CM

## 2019-02-26 DIAGNOSIS — N39.46 URINARY INCONTINENCE, MIXED: ICD-10-CM

## 2019-02-26 PROCEDURE — 97162 PT EVAL MOD COMPLEX 30 MIN: CPT | Performed by: PHYSICAL THERAPIST

## 2019-02-26 NOTE — THERAPY EVALUATION
"    Outpatient Physical Therapy Women's Health Initial Evaluation  [facility]     Patient Name: Emily Chou  : 1970  MRN: [id number]  Today's Date: 2019        Visit Date: 2019    Patient Active Problem List   Diagnosis   • Urinary incontinence, mixed        Past Medical History:   Diagnosis Date   • Abdominal pain    • Bloating    • Fibroids    • Functional bowel disorder     RECTOCELE   • Heavy periods    • Hypertension    • Menorrhagia         Past Surgical History:   Procedure Laterality Date   • BLADDER SUSPENSION      Dr. Giles in [place]   • ENDOMETRIAL ABLATION     • POSTERIOR VAGINAL REPAIR N/A 2018    Procedure: RECTOCELE;  Surgeon: Deana Young MD;  Location:  [facility] OR;  Service: Obstetrics/Gynecology   • TOTAL LAPAROSCOPIC HYSTERECTOMY SALPINGO OOPHORECTOMY N/A 2018    Procedure: TOTAL LAPAROSCOPIC HYSTERECTOMY BILATERAL SALPINGECTOMY WITH DAVINCI SI ROBOT, CYSTO;  Surgeon: Deana Young MD;  Location:  [facility] OR;  Service: DaVinci         Visit Dx:    ICD-10-CM ICD-9-CM   1. Incontinence of feces, unspecified fecal incontinence type R15.9 787.60   2. Mixed urinary incontinence due to female genital prolapse N39.46 788.33    N81.9 618.9   3. Weakness R53.1 780.79   4. Rectocele N81.6 618.04       Patient History     Row Name 19 1055             History    Chief Complaint  Muscle weakness  -KR      Date Current Problem(s) Began  -- prior to   -KR      Brief Description of Current Complaint  She reports long history of bowel and bladder incontinence. She reports having mesh repair for bladder prolapse and had to have it \"loosened\" because it was too tight. She was just leaking urine at this point. She reports fecal incontinence starting in , which progressively worsened. She reports having hysterectomy and rectocele repair, but still having fecal incontience, she reports nearly happening on a daily basis. She feels an urgency, but sometimes feels like " it is just gas, not feces. She reports also notcing FI when getting up from floor sometimes. She also reports several occassions where her partner forcefully penetrated anally with severe pain and noticing her FI starting shortly afterwards. She reports incomplete emptying of her bowels with straining at times. She reports having balloon testing and having barium enema in [place]. She reports that it showed incomplete emptying and was supposed to start biofeedback at this point, but ended up moving. 1 or less night voids. She reports mixed UI as well.  She reports feeling as if her bowel movements are more loose. She reports trying to switch over to plant based diet. She has two cups of coffee in the morning, mostly water. Reports not eating sugar. She reports pelvic pressure and pain with intercourse prior to hysterectomy, but this has resolved. She reports being diagnosed with a spondy in her lower back by chiropractor years ago. She reports being very active overall, enjoys outside activities, does yoga regularly, wants to get back in gym and lifting weights. She reports fearful of return to the gym and going in public secondary to FI. She is due to have an interstim trial next week. She does report when there is a big hail storm, she may get called out of town to work.   -KR      Patient/Caregiver Goals  Return to prior level of function;Improve strength;Know what to do to help the symptoms  -KR      Patient's Rating of General Health  Excellent  -KR      Occupation/sports/leisure activities  ; prior massage thearpist and resp therapist  -KR      Patient seeing anyone else for problem(s)?  yes  -KR      How has patient tried to help current problem?  surgery  -KR      Are you or can you be pregnant  No  -KR         Pain     Pain at Present  0  -KR      Pain at Best  0  -KR      Pain at Worst  0  -KR         Fall Risk Assessment    Any falls in the past year:  No  -KR         Services    Prior  Rehab/Home Health Experiences  No  -KR      Are you currently receiving Home Health services  No  -KR      Do you plan to receive Home Health services in the near future  No  -KR         Daily Activities    Are you able to read  Yes  -KR      Are you able to write  Yes  -KR      How does patient learn best?  Listening  -KR      Teaching needs identified  Home Exercise Program;Management of Condition  -KR      Does patient have problems with the following?  None  -KR      Barriers to learning  None  -KR      Pt Participated in POC and Goals  Yes  -KR         Safety    Are you being hurt, hit, or frightened by anyone at home or in your life?  No  -KR      Are you being neglected by a caregiver  No  -KR        User Key  (r) = Recorded By, (t) = Taken By, (c) = Cosigned By    Initials Name Provider Type    Katy Olguin PT DPT Physical Therapist          Pelvic Health     Row Name 02/26/19 1055             Pregnancy Questions    Number of Pregnancies  1  -KR      Number of Children  1  -KR      Type of Previous Deliveries  Vaginal over 8#, with episiotomy  -KR      Do you have radicular pain or numbness?  No  -KR      Are you currently exercising?  Yes yoga at least 3 times per week  -KR         Pelvic Floor Muscle    Strength (Right)  2: Squeeze no lift  -KR      Strength (Left)  1: Trace  -KR      Symmetry of Sustained Maximal Contraction  Right stronger than left  -KR      Endurance (Ability to Hold Maximal Contraction)  2 sec  -KR      # of Reps of Maximal Contractions while Maintaining Endurance and Strength  1 set  -KR      Fast Contraction (# of 1 sec contractions performed)  3  -KR      Interior Muscle Comments  verbal consent for internal pelvic floor assessment. Pt did have slight tenderness and muscle guarding L pelvic floor.   -KR         Perineal Observation    Perineal Observation Performed?  Yes  -KR         Pelvic Floor    Ability to Isolate Contraction of Pelvic Floor  No  -KR      Overflow  from Adjacent Muscles  Upper Abdominals  -KR         Cough    Leak  Few Drops  -KR      Other Cough  leaks gas  -KR        User Key  (r) = Recorded By, (t) = Taken By, (c) = Cosigned By    Initials Name Provider Type    Katy Olguin PT DPT Physical Therapist        PT Ortho     Row Name 02/26/19 1054       Quarter Clearing    Quarter Clearing  Lower Quarter Clearing  -KR       Myotomal Screen- Lower Quarter Clearing    Hip flexion (L2)  Bilateral:;4+ (Good +)  -KR       General ROM    GENERAL ROM COMMENTS  B PROM hip WFL  -KR       MMT (Manual Muscle Testing)    General MMT Comments  glut med 4-/5  -KR       Sensation    Light Touch  No apparent deficits  -KR      User Key  (r) = Recorded By, (t) = Taken By, (c) = Cosigned By    Initials Name Provider Type    Katy Olguin PT DPT Physical Therapist                     PT Assessment/Plan     Row Name 02/26/19 1059          PT Assessment    Functional Limitations  Limitation in home management;Limitations in community activities;Performance in leisure activities;Performance in self-care ADL  -KR     Impairments  Endurance;Impaired muscle endurance;Impaired muscle power;Muscle strength  -KR     Assessment Comments  Emily presents with long history of pelvic floor dysfunction. Upon assessment I found her hips to be slight weak, but overaall her pelvic floor was very weak and she was limtied with isolation. She is very motivated and should progress well. She does report having a work travel schedule that could interfere with her attendance.   -KR     Please refer to paper survey for additional self-reported information  Yes  -KR     Rehab Potential  Good  -KR     Patient/caregiver participated in establishment of treatment plan and goals  Yes  -KR     Patient would benefit from skilled therapy intervention  Yes  -KR        PT Plan    PT Frequency  -- 1-8 times per month  -KR     Predicted Duration of Therapy Intervention (Therapy Eval)  3-4 months   -KR     Planned CPT's?  PT EVAL MOD COMPLELITY: 52444;PT THER PROC EA 15 MIN: 14136;PT THER ACT EA 15 MIN: 34406;PT MANUAL THERAPY EA 15 MIN: 15835;PT NEUROMUSC RE-EDUCATION EA 15 MIN: 82215;PT GAIT TRAINING EA 15 MIN: 53692;PT HOT OR COLD PACK TREAT MCARE;PT ELECTRICAL STIM UNATTEND: ;PT ELECTRICAL STIM ATTD EA 15 MIN: 11875  -KR     Physical Therapy Interventions (Optional Details)  balance training;bed mobility training;gait training;home exercise program;joint mobilization;lumbar stabilization;manual therapy techniques;modalities;neuromuscular re-education;postural re-education;patient/family education;strengthening;swiss ball techniques;transfer training;taping  -KR     PT Plan Comments  We will initially work on pelvic floor isolation and progressing with strengthening and endurance. We will also progress with core and hip strengthening and progressing towards community gym based activity.   -KR       User Key  (r) = Recorded By, (t) = Taken By, (c) = Cosigned By    Initials Name Provider Type    Katy Olguin, PT DPT Physical Therapist              02/26/19 1342   PT Short Term Goals   STG Date to Achieve 04/09/19   STG 1 Pt will isolate her pelvic floor with proper breathing in supine.    STG 1 Progress New   Long Term Goals   LTG Date to Achieve 05/21/19   LTG 1 Pt will isolate her pelvic floor with proper breathing in sitting, holding 5 seconds or greater.    LTG 1 Progress New   LTG 2 Pt will be independent with HEP to include pelvic floor, hip and core strengthening.    LTG 2 Progress New   LTG 3 Pt will report 1 or less incontinence episodes in one week.    LTG 3 Progress New   LTG 4 Pt will isolate her pelvic floor with proper breathing in standing.    LTG 4 Progress New   Time Calculation   PT Goal Re-Cert Due Date 03/28/19                         Therapy Education  Education Details: kegels, bridges, and kegels while propped in hooklying, self colon massage  Given: HEP,  Symptoms/condition management, Posture/body mechanics  Program: New  How Provided: Verbal, Demonstration, Written  Provided to: Patient  Level of Understanding: Verbalized, Demonstrated               Time Calculation:   Start Time: 1055  Stop Time: 1200  Time Calculation (min): 65 min  Total Timed Code Minutes- PT: 0 minute(s)  Therapy Suggested Charges     Code   Minutes Charges    None           Therapy Charges for Today     Code Description Service Date Service Provider Modifiers Qty    28445074109 HC PT EVAL MOD COMPLEXITY 4 2/26/2019 Katy Crawford, PT DPT GP 1                  Katy Crawford, PT DPT  2/26/2019

## 2019-02-28 ENCOUNTER — APPOINTMENT (OUTPATIENT)
Dept: PHYSICAL THERAPY | Facility: HOSPITAL | Age: 49
End: 2019-02-28

## 2019-03-01 ENCOUNTER — HOSPITAL ENCOUNTER (OUTPATIENT)
Dept: PHYSICAL THERAPY | Facility: HOSPITAL | Age: 49
Setting detail: THERAPIES SERIES
Discharge: HOME | End: 2019-03-01

## 2019-03-01 DIAGNOSIS — R53.1 WEAKNESS: ICD-10-CM

## 2019-03-01 DIAGNOSIS — N81.9 MIXED URINARY INCONTINENCE DUE TO FEMALE GENITAL PROLAPSE: ICD-10-CM

## 2019-03-01 DIAGNOSIS — N81.6 RECTOCELE: ICD-10-CM

## 2019-03-01 DIAGNOSIS — N39.46 MIXED URINARY INCONTINENCE DUE TO FEMALE GENITAL PROLAPSE: ICD-10-CM

## 2019-03-01 DIAGNOSIS — R15.9 INCONTINENCE OF FECES, UNSPECIFIED FECAL INCONTINENCE TYPE: Primary | ICD-10-CM

## 2019-03-01 PROCEDURE — 97110 THERAPEUTIC EXERCISES: CPT | Performed by: PHYSICAL THERAPIST

## 2019-03-01 NOTE — THERAPY TREATMENT NOTE
Outpatient Physical Therapy Women's Health Treatment Note  [facility]     Patient Name: Emily Chou  : 1970  MRN: [id number]  Today's Date: 3/1/2019        Visit Date: 2019    Visit Dx:    ICD-10-CM ICD-9-CM   1. Incontinence of feces, unspecified fecal incontinence type R15.9 787.60   2. Mixed urinary incontinence due to female genital prolapse N39.46 788.33    N81.9 618.9   3. Weakness R53.1 780.79   4. Rectocele N81.6 618.04       Patient Active Problem List   Diagnosis   • Urinary incontinence, mixed                         PT Assessment/Plan     Row Name 19 0705          PT Assessment    Assessment Comments  No goals met, today was her first visit since initial evaluation. She showed the abiltiy to hold 3 seconds in sidelying with sEMG, but we had to shorten that activity secondary to her having to use the restroom. We then focused on hip and core stregthening.   -KR        PT Plan    PT Plan Comments  Conitnue to work on pelvic floor strengthening, progressing with core, hip strengthening and towards gym activities.   -KR       User Key  (r) = Recorded By, (t) = Taken By, (c) = Cosigned By    Initials Name Provider Type    Katy Olguin, PT DPT Physical Therapist                Exercises     Row Name 19 0700          Subjective Comments    Subjective Comments  She reports compliance with her HEP. She does report having to focus to prevent using other muscles with Kegels.  -KR  --        Subjective Pain    Able to rate subjective pain?  yes  -KR  --     Pre-Treatment Pain Level  0  -KR  --     Post-Treatment Pain Level  0  -KR  --        Total Minutes    30800 - PT Therapeutic Exercise Minutes  --  40  -KR        Exercise 1    Exercise Name 1  sEMG to external pelvic floor  -KR  --     Additional Comments  holding 3 seconds;   -KR  --        Exercise 2    Exercise Name 2  Pt had to use the restroom, so sEMG work was shortened  -KR  --        Exercise 3     Exercise Name 3  bridge with hip adduction with ball  -KR  --     Cueing 3  Verbal  -KR  --     Sets 3  3  -KR  --     Reps 3  10  -KR  --        Exercise 4    Exercise Name 4  hooklying clams with green TB  -KR  --     Cueing 4  Verbal  -KR  --     Sets 4  3  -KR  --     Reps 4  10  -KR  --        Exercise 5    Exercise Name 5  Marches on SB  -KR  --     Cueing 5  Verbal  -KR  --     Sets 5  3  -KR  --     Reps 5  20  -KR  --       User Key  (r) = Recorded By, (t) = Taken By, (c) = Cosigned By    Initials Name Provider Type    Katy Olguin, PT DPT Physical Therapist                      PT OP Goals     Row Name 03/01/19 0700          PT Short Term Goals    STG 1  Pt will isolate her pelvic floor with proper breathing in supine.   -KR     STG 1 Progress  Ongoing  -KR        Long Term Goals    LTG 1  Pt will isolate her pelvic floor with proper breathing in sitting, holding 5 seconds or greater.   -KR     LTG 1 Progress  Ongoing  -KR     LTG 2  Pt will be independent with HEP to include pelvic floor, hip and core strengthening.   -KR     LTG 2 Progress  Ongoing  -KR     LTG 3  Pt will report 1 or less incontinence episodes in one week.   -KR     LTG 3 Progress  Ongoing  -KR     LTG 4  Pt will isolate her pelvic floor with proper breathing in standing.   -KR     LTG 4 Progress  Ongoing  -KR        Time Calculation    PT Goal Re-Cert Due Date  03/28/19  -KR       User Key  (r) = Recorded By, (t) = Taken By, (c) = Cosigned By    Initials Name Provider Type    Katy Olguin, PT DPT Physical Therapist           Therapy Education  Given: HEP, Symptoms/condition management  Program: Reinforced  How Provided: Verbal  Provided to: Patient  Level of Understanding: Verbalized              Time Calculation:   Start Time: 0705  Stop Time: 0745  Time Calculation (min): 40 min  Total Timed Code Minutes- PT: 40 minute(s)  Therapy Suggested Charges     Code   Minutes Charges    79482 (CPT®) Hc Pt Neuromusc Re  Education Ea 15 Min      78510 (CPT®) Hc Pt Ther Proc Ea 15 Min 40 3    40073 (CPT®) Hc Gait Training Ea 15 Min      02349 (CPT®) Hc Pt Therapeutic Act Ea 15 Min      95049 (CPT®) Hc Pt Manual Therapy Ea 15 Min      37013 (CPT®) Hc Pt Ther Massage- Per 15 Min      39345 (CPT®) Hc Pt Iontophoresis Ea 15 Min      22809 (CPT®) Hc Pt Elec Stim Ea-Per 15 Min      18085 (CPT®) Hc Pt Ultrasound Ea 15 Min      55653 (CPT®) Hc Pt Self Care/Mgmt/Train Ea 15 Min      56360 (CPT®) Hc Pt Prosthetic (S) Train Initial Encounter, Each 15 Min      35071 (CPT®) Hc Orthotic(S) Mgmt/Train Initial Encounter, Each 15min      25197 (CPT®) Hc Pt Aquatic Therapy Ea 15 Min      04949 (CPT®) Hc Pt Orthotic(S)/Prosthetic(S) Encounter, Each 15 Min       (CPT®) Hc Pt Electrical Stim Unattended      Total  40 3        Therapy Charges for Today     Code Description Service Date Service Provider Modifiers Qty    91413444977 HC PT THER PROC EA 15 MIN 3/1/2019 Katy Crawford, PT DPT GP 3                    Katy Crawford, PT DPT  3/1/2019

## 2019-03-04 ENCOUNTER — HOSPITAL ENCOUNTER (OUTPATIENT)
Dept: PHYSICAL THERAPY | Facility: HOSPITAL | Age: 49
Setting detail: THERAPIES SERIES
Discharge: HOME | End: 2019-03-04

## 2019-03-04 DIAGNOSIS — N39.46 URINARY INCONTINENCE, MIXED: ICD-10-CM

## 2019-03-04 DIAGNOSIS — R15.9 INCONTINENCE OF FECES, UNSPECIFIED FECAL INCONTINENCE TYPE: Primary | ICD-10-CM

## 2019-03-04 DIAGNOSIS — N81.6 RECTOCELE: ICD-10-CM

## 2019-03-04 DIAGNOSIS — N39.46 MIXED URINARY INCONTINENCE DUE TO FEMALE GENITAL PROLAPSE: ICD-10-CM

## 2019-03-04 DIAGNOSIS — N81.9 MIXED URINARY INCONTINENCE DUE TO FEMALE GENITAL PROLAPSE: ICD-10-CM

## 2019-03-04 DIAGNOSIS — R53.1 WEAKNESS: ICD-10-CM

## 2019-03-04 PROCEDURE — 97110 THERAPEUTIC EXERCISES: CPT | Performed by: PHYSICAL THERAPIST

## 2019-03-04 NOTE — THERAPY TREATMENT NOTE
Outpatient Physical Therapy Women's Health Treatment Note  [facility]     Patient Name: Emily Chou  : 1970  MRN: [id number]  Today's Date: 3/4/2019        Visit Date: 2019    Visit Dx:    ICD-10-CM ICD-9-CM   1. Incontinence of feces, unspecified fecal incontinence type R15.9 787.60   2. Mixed urinary incontinence due to female genital prolapse N39.46 788.33    N81.9 618.9   3. Weakness R53.1 780.79   4. Rectocele N81.6 618.04   5. Urinary incontinence, mixed N39.46 788.33       Patient Active Problem List   Diagnosis   • Urinary incontinence, mixed                         PT Assessment/Plan     Row Name 19 1400          PT Assessment    Assessment Comments  Partially met STG of isolating pelvic floor musculature 30% of the time with verbal cues.  -KR        PT Plan    PT Plan Comments  Continue to progress pelvic floor strengthening, timing and endurance including hip and core strengthening.   -KR       User Key  (r) = Recorded By, (t) = Taken By, (c) = Cosigned By    Initials Name Provider Type    Darleen Buckner, PT DPT Physical Therapist                Exercises     Row Name 19 1400 19 1300          Subjective Comments    Subjective Comments  --  She reports fecal incontinence has been present since  and she has had a rectocele repair with improvement initially, however fecal incontinence is occurring 3 - 5 times per week.  She complains of urinary incontinece that occurs from sitting to standing, coughing, sneezing, laughing or impact from coming off a high curb or step.  She does not wear a pad, but always takes a change of clothes with her.  -KR        Subjective Pain    Pre-Treatment Pain Level  --  0  -KR     Post-Treatment Pain Level  --  0  -KR        Total Minutes    43931 - PT Therapeutic Exercise Minutes  60  -KR  --        Exercise 1    Exercise Name 1  --  Internal assessment   -KR     Cueing 1  --  Verbal;Tactile  -KR     Additional Comments  --  Levator  scapula R 3/5; L 3/5; Hold R 6 sec.L 4 sec; Reps R 7, L 4; Quick contractions R 9, L 7  -KR        Exercise 2    Exercise Name 2  --  PFM contractions 6 sec holds; 7 reps  -KR     Cueing 2  --  Verbal;Tactile  -KR     Sets 2  --  2  -KR        Exercise 3    Exercise Name 3  --  Quick contractions - R 9reps; L 7 reps  -KR     Cueing 3  --  Verbal;Tactile  -KR     Sets 3  --  2  -KR        Exercise 4    Exercise Name 4  --  Supine bridge w/hip abduction green t-band  -KR     Cueing 4  --  Verbal  -KR     Sets 4  --  3  -KR     Reps 4  --  10  -KR     Additional Comments  --  Performed w/ PFM and TA contractions  -KR        Exercise 5    Exercise Name 5  --  Hip Adduction w/ ball  -KR     Cueing 5  --  Verbal  -KR     Sets 5  --  3  -KR     Reps 5  --  10  -KR     Additional Comments  --  Performed w/ PFM and TA contractions  -KR       User Key  (r) = Recorded By, (t) = Taken By, (c) = Cosigned By    Initials Name Provider Type    Darleen Buckner, PT DPT Physical Therapist                      PT OP Goals     Row Name 03/04/19 1410          PT Short Term Goals    STG 1  Pt will isolate her pelvic floor with proper breathing in supine.   -KR     STG 1 Progress  Progressing  -KR     STG 1 Progress Comments  Patient was able to perform 30% of time  -KR        Long Term Goals    LTG 1  Pt will isolate her pelvic floor with proper breathing in sitting, holding 5 seconds or greater.   -KR     LTG 1 Progress  Ongoing  -KR     LTG 2  Pt will be independent with HEP to include pelvic floor, hip and core strengthening.   -KR     LTG 2 Progress  Ongoing  -KR     LTG 3  Pt will report 1 or less incontinence episodes in one week.   -KR     LTG 3 Progress  Ongoing  -KR     LTG 4  Pt will isolate her pelvic floor with proper breathing in standing.   -KR     LTG 4 Progress  Ongoing  -KR       User Key  (r) = Recorded By, (t) = Taken By, (c) = Cosigned By    Initials Name Provider Type    Darleen Buckner, PT DPT Physical Therapist            Therapy Education  Education Details: PFM contractions, quick contractions, hip lift with resisted hip abduction and isometric adduction w/ball   Program: Reinforced, Progressed  How Provided: Verbal, Demonstration, Written  Provided to: Patient  Level of Understanding: Verbalized, Demonstrated              Time Calculation:   Start Time: 1300  Stop Time: 1400  Time Calculation (min): 60 min  Total Timed Code Minutes- PT: 60 minute(s)  Therapy Suggested Charges     Code   Minutes Charges    96783 (CPT®) Hc Pt Neuromusc Re Education Ea 15 Min      48796 (CPT®) Hc Pt Ther Proc Ea 15 Min 60 4    41220 (CPT®) Hc Gait Training Ea 15 Min      37798 (CPT®) Hc Pt Therapeutic Act Ea 15 Min      99842 (CPT®) Hc Pt Manual Therapy Ea 15 Min      93376 (CPT®) Hc Pt Ther Massage- Per 15 Min      11370 (CPT®) Hc Pt Iontophoresis Ea 15 Min      33062 (CPT®) Hc Pt Elec Stim Ea-Per 15 Min      82527 (CPT®) Hc Pt Ultrasound Ea 15 Min      86067 (CPT®) Hc Pt Self Care/Mgmt/Train Ea 15 Min      56067 (CPT®) Hc Pt Prosthetic (S) Train Initial Encounter, Each 15 Min      25620 (CPT®) Hc Orthotic(S) Mgmt/Train Initial Encounter, Each 15min      65978 (CPT®) Hc Pt Aquatic Therapy Ea 15 Min      68956 (CPT®) Hc Pt Orthotic(S)/Prosthetic(S) Encounter, Each 15 Min       (CPT®) Hc Pt Electrical Stim Unattended      Total  60 4        Therapy Charges for Today     Code Description Service Date Service Provider Modifiers Qty    10230661857 HC PT THER PROC EA 15 MIN 3/4/2019 Darleen Still, PT DPT GP 4                    Darleen Still, PT DPT  3/4/2019

## 2019-03-05 ENCOUNTER — OFFICE VISIT (OUTPATIENT)
Dept: OBSTETRICS AND GYNECOLOGY | Facility: CLINIC | Age: 49
End: 2019-03-05

## 2019-03-05 VITALS
WEIGHT: 171 LBS | DIASTOLIC BLOOD PRESSURE: 92 MMHG | BODY MASS INDEX: 29.19 KG/M2 | SYSTOLIC BLOOD PRESSURE: 132 MMHG | HEIGHT: 64 IN

## 2019-03-05 DIAGNOSIS — Z78.9 NONSMOKER: ICD-10-CM

## 2019-03-05 DIAGNOSIS — R15.9 INCONTINENCE OF FECES, UNSPECIFIED FECAL INCONTINENCE TYPE: ICD-10-CM

## 2019-03-05 DIAGNOSIS — R32 URINARY INCONTINENCE, UNSPECIFIED TYPE: Primary | ICD-10-CM

## 2019-03-05 PROCEDURE — 64561 IMPLANT NEUROELECTRODES: CPT | Performed by: OBSTETRICS & GYNECOLOGY

## 2019-03-05 NOTE — PROGRESS NOTES
"Subjective   Chief Complaint   Patient presents with   • Urinary Incontinence     pt here today for PNE trial for urinary incontinence. pt voices no other concerns.      Emily Chou is a 48 y.o. year old .  Patient's last menstrual period was 2018 (exact date).  She presents to be seen because of both urinary incontinence and fecal incontinence.  Patient here for temporary trial of Interstim.     The following portions of the patient's history were reviewed and updated as appropriate:current medications and allergies    Social History    Tobacco Use      Smoking status: Never Smoker      Smokeless tobacco: Never Used    Review of Systems   Respiratory: Negative for shortness of breath.    Cardiovascular: Negative for chest pain.   Gastrointestinal: Negative for abdominal pain.        Fecal incontinence for 1-2 years   Endocrine: Negative for cold intolerance and heat intolerance.   Genitourinary: Positive for enuresis (mixed RADHA and OAB). Negative for difficulty urinating, dyspareunia, menstrual problem (s/p hysterectomy) and vaginal bleeding.        Also having fecal incontinence   Skin: Negative for rash.   Psychiatric/Behavioral: Negative for dysphoric mood. The patient is not nervous/anxious.          Objective   /92   Ht 162.6 cm (64\")   Wt 77.6 kg (171 lb)   LMP 2018 (Exact Date)   BMI 29.35 kg/m²     Physical Exam   Constitutional: She is oriented to person, place, and time. She appears well-developed and well-nourished. No distress.   HENT:   Head: Normocephalic and atraumatic.   Neck: Normal range of motion.   Pulmonary/Chest: Effort normal.   Genitourinary:   Genitourinary Comments: Low back prepped and draped in prone position.  Interstim lead placed in S3, bilaterally, with sensation at perineum reported for both leads.  Patient tolerated well.   Neurological: She is alert and oriented to person, place, and time.   Skin: She is not diaphoretic.   Psychiatric: She has a " normal mood and affect. Her behavior is normal.   Nursing note and vitals reviewed.      Lab Review   No data reviewed    Imaging   No data reviewed     Assessment & Plan    Emily was seen today for urinary incontinence.    Diagnoses and all orders for this visit:    Urinary incontinence, unspecified type: Bilateral placement of Interstim PNE today.  Patient tolerated well; she will return Friday for lead removal.    Incontinence of feces, unspecified fecal incontinence type    Nonsmoker      This note was electronically signed.    Deana Young MD  March 5, 2019  3:46 PM

## 2019-03-07 ENCOUNTER — HOSPITAL ENCOUNTER (OUTPATIENT)
Dept: PHYSICAL THERAPY | Facility: HOSPITAL | Age: 49
Setting detail: THERAPIES SERIES
Discharge: HOME | End: 2019-03-07

## 2019-03-07 DIAGNOSIS — N81.6 RECTOCELE: ICD-10-CM

## 2019-03-07 DIAGNOSIS — N39.46 URINARY INCONTINENCE, MIXED: ICD-10-CM

## 2019-03-07 DIAGNOSIS — N81.9 MIXED URINARY INCONTINENCE DUE TO FEMALE GENITAL PROLAPSE: ICD-10-CM

## 2019-03-07 DIAGNOSIS — R15.9 INCONTINENCE OF FECES, UNSPECIFIED FECAL INCONTINENCE TYPE: Primary | ICD-10-CM

## 2019-03-07 DIAGNOSIS — N39.46 MIXED URINARY INCONTINENCE DUE TO FEMALE GENITAL PROLAPSE: ICD-10-CM

## 2019-03-07 DIAGNOSIS — R53.1 WEAKNESS: ICD-10-CM

## 2019-03-07 PROCEDURE — 97110 THERAPEUTIC EXERCISES: CPT | Performed by: PHYSICAL THERAPIST

## 2019-03-07 NOTE — THERAPY TREATMENT NOTE
Outpatient Physical Therapy Women's Health Treatment Note  [facility]     Patient Name: Emily Chou  : 1970  MRN: [id number]  Today's Date: 3/7/2019        Visit Date: 2019    Visit Dx:    ICD-10-CM ICD-9-CM   1. Incontinence of feces, unspecified fecal incontinence type R15.9 787.60   2. Mixed urinary incontinence due to female genital prolapse N39.46 788.33    N81.9 618.9   3. Weakness R53.1 780.79   4. Rectocele N81.6 618.04   5. Urinary incontinence, mixed N39.46 788.33       Patient Active Problem List   Diagnosis   • Urinary incontinence, mixed                         PT Assessment/Plan     Row Name 19 1600          PT Assessment    Assessment Comments  Patient inhibited today due to pain from Interstim. She was able to hold PFM contraction for longer period and increased reps as well.   -KR        PT Plan    PT Plan Comments  Continue to progress HEP as appropriate.  -KR       User Key  (r) = Recorded By, (t) = Taken By, (c) = Cosigned By    Initials Name Provider Type    Darleen Buckner, PT DPT Physical Therapist                Exercises     Row Name 19 1505             Subjective Comments    Subjective Comments  She reports she had Interstim leads inserted 2 days ago and has felt like she has the flu in  her low back since then with sharp, shooting pain intermittently in right low back and left pelvic region. She complains of increased pain with pelvic floor exercises today.  -KR         Subjective Pain    Able to rate subjective pain?  yes  -KR      Pre-Treatment Pain Level  7  -KR      Post-Treatment Pain Level  8  -KR         Total Minutes    18195 - PT Therapeutic Exercise Minutes  40  -KR         Exercise 1    Exercise Name 1  PFM contraction  -KR      Cueing 1  Verbal;Tactile  -KR      Sets 1  2  -KR      Reps 1  10  -KR      Additional Comments  Hold time and rest increased to 8  -KR         Exercise 2    Exercise Name 2  Contract/relax  -KR      Cueing 2   Verbal;Tactile  -KR      Sets 2  2  -KR      Reps 2  10  -KR         Exercise 3    Exercise Name 3  TA activation w/ PFM contractions  -KR      Cueing 3  Verbal;Tactile  -KR      Additional Comments  Minimal TA activation with PFM contractions  -KR        User Key  (r) = Recorded By, (t) = Taken By, (c) = Cosigned By    Initials Name Provider Type    Darleen Buckner, PT DPT Physical Therapist                      PT OP Goals     Row Name 03/07/19 1505          PT Short Term Goals    STG 1  Pt will isolate her pelvic floor with proper breathing in supine.   -KR     STG 1 Progress  Progressing  -KR     STG 1 Progress Comments  Patient vinayak the pelvic floor with less overflow  -KR        Long Term Goals    LTG 1  Pt will isolate her pelvic floor with proper breathing in sitting, holding 5 seconds or greater.   -KR     LTG 1 Progress  Ongoing  -KR     LTG 2  Pt will be independent with HEP to include pelvic floor, hip and core strengthening.   -KR     LTG 2 Progress  Ongoing  -KR     LTG 3  Pt will report 1 or less incontinence episodes in one week.   -KR     LTG 3 Progress  Ongoing  -KR     LTG 4  Pt will isolate her pelvic floor with proper breathing in standing.   -KR     LTG 4 Progress  Ongoing  -KR        Time Calculation    PT Goal Re-Cert Due Date  03/28/19  -KR       User Key  (r) = Recorded By, (t) = Taken By, (c) = Cosigned By    Initials Name Provider Type    Darleen Buckner, PT DPT Physical Therapist           Therapy Education  Education Details: Progressed hold and reps on PFM contractions  Given: HEP  Program: Reinforced, Progressed  How Provided: Verbal, Demonstration, Written  Provided to: Patient  Level of Understanding: Verbalized, Demonstrated              Time Calculation:   Start Time: 1505  Stop Time: 1545  Time Calculation (min): 40 min  Total Timed Code Minutes- PT: 40 minute(s)  Therapy Suggested Charges     Code   Minutes Charges    40324 (CPT®) Hc Pt Neuromusc Re Education Ea 15 Min       87330 (CPT®) Hc Pt Ther Proc Ea 15 Min 40 3    58776 (CPT®) Hc Gait Training Ea 15 Min      20714 (CPT®) Hc Pt Therapeutic Act Ea 15 Min      56949 (CPT®) Hc Pt Manual Therapy Ea 15 Min      27247 (CPT®) Hc Pt Ther Massage- Per 15 Min      08308 (CPT®) Hc Pt Iontophoresis Ea 15 Min      84016 (CPT®) Hc Pt Elec Stim Ea-Per 15 Min      08023 (CPT®) Hc Pt Ultrasound Ea 15 Min      53375 (CPT®) Hc Pt Self Care/Mgmt/Train Ea 15 Min      36116 (CPT®) Hc Pt Prosthetic (S) Train Initial Encounter, Each 15 Min      80916 (CPT®) Hc Orthotic(S) Mgmt/Train Initial Encounter, Each 15min      97512 (CPT®) Hc Pt Aquatic Therapy Ea 15 Min      52983 (CPT®) Hc Pt Orthotic(S)/Prosthetic(S) Encounter, Each 15 Min       (CPT®) Hc Pt Electrical Stim Unattended      Total  40 3        Therapy Charges for Today     Code Description Service Date Service Provider Modifiers Qty    55345024351 HC PT THER PROC EA 15 MIN 3/7/2019 Darleen Still, PT DPT GP 3                    Darleen Still, PT DPT  3/7/2019

## 2019-03-08 ENCOUNTER — APPOINTMENT (OUTPATIENT)
Dept: PHYSICAL THERAPY | Facility: HOSPITAL | Age: 49
End: 2019-03-08

## 2019-03-11 ENCOUNTER — APPOINTMENT (OUTPATIENT)
Dept: PHYSICAL THERAPY | Facility: HOSPITAL | Age: 49
End: 2019-03-11

## 2019-03-12 ENCOUNTER — HOSPITAL ENCOUNTER (OUTPATIENT)
Dept: PHYSICAL THERAPY | Facility: HOSPITAL | Age: 49
Setting detail: THERAPIES SERIES
End: 2019-03-12

## 2019-03-14 ENCOUNTER — HOSPITAL ENCOUNTER (OUTPATIENT)
Dept: PHYSICAL THERAPY | Facility: HOSPITAL | Age: 49
Setting detail: THERAPIES SERIES
End: 2019-03-14

## 2019-03-14 DIAGNOSIS — R15.9 INCONTINENCE OF FECES, UNSPECIFIED FECAL INCONTINENCE TYPE: Primary | ICD-10-CM

## 2019-03-19 ENCOUNTER — TELEPHONE (OUTPATIENT)
Dept: OBSTETRICS AND GYNECOLOGY | Facility: CLINIC | Age: 49
End: 2019-03-19

## 2019-03-19 NOTE — TELEPHONE ENCOUNTER
Pt called to let us know that the test that was performed in [place] was called a Defectogram  Jessica has corrected the records request and it has been refaxed

## 2019-03-25 ENCOUNTER — HOSPITAL ENCOUNTER (OUTPATIENT)
Dept: PHYSICAL THERAPY | Facility: HOSPITAL | Age: 49
Setting detail: THERAPIES SERIES
End: 2019-03-25

## 2019-05-22 ENCOUNTER — DOCUMENTATION (OUTPATIENT)
Dept: PHYSICAL THERAPY | Facility: HOSPITAL | Age: 49
End: 2019-05-22

## 2021-01-29 ENCOUNTER — OFFICE VISIT (OUTPATIENT)
Dept: CARDIOLOGY | Facility: CLINIC | Age: 51
End: 2021-01-29

## 2021-01-29 VITALS
WEIGHT: 192 LBS | SYSTOLIC BLOOD PRESSURE: 122 MMHG | BODY MASS INDEX: 32.78 KG/M2 | HEART RATE: 77 BPM | HEIGHT: 64 IN | OXYGEN SATURATION: 98 % | DIASTOLIC BLOOD PRESSURE: 76 MMHG

## 2021-01-29 DIAGNOSIS — E78.2 MIXED HYPERLIPIDEMIA: ICD-10-CM

## 2021-01-29 DIAGNOSIS — R07.2 PRECORDIAL CHEST PAIN: ICD-10-CM

## 2021-01-29 DIAGNOSIS — I10 ESSENTIAL HYPERTENSION: Primary | ICD-10-CM

## 2021-01-29 DIAGNOSIS — R00.2 PALPITATIONS: ICD-10-CM

## 2021-01-29 DIAGNOSIS — R06.09 DOE (DYSPNEA ON EXERTION): ICD-10-CM

## 2021-01-29 PROCEDURE — 99204 OFFICE O/P NEW MOD 45 MIN: CPT | Performed by: INTERNAL MEDICINE

## 2021-01-29 RX ORDER — ESCITALOPRAM OXALATE 10 MG/1
10 TABLET ORAL DAILY
COMMUNITY
Start: 2020-11-05

## 2021-01-29 RX ORDER — EZETIMIBE 10 MG/1
TABLET ORAL
COMMUNITY
Start: 2021-01-19

## 2021-01-29 RX ORDER — LEVOTHYROXINE, LIOTHYRONINE 19; 4.5 UG/1; UG/1
TABLET ORAL
COMMUNITY
Start: 2021-01-21

## 2021-01-29 NOTE — PROGRESS NOTES
Emily Chou  [id number]  1970  50 y.o.  female    Referring Provider: Cindy Shine APRN    Reason for  Visit:  Initial visit for  for chest pain and   Cardiac workup test results as below : treadmill in past     Subjective     Chest pain with exertion,  but at times can also occur at rest   Moderate substernal,   Heaviness  Chest pain non pleuritic  Chest pain non positional and non gustatory   Lasts less than 5-10 minutes    Started 6 months ago  Occurs once or twice a week  No associated diaphoresis    Associated nausea  No radiation    Relieved with rest or spontaneously  Not positional    No change with intake of food or antacids  No change with breathing  Mild to moderate associated dyspnea      No associated palpitations  No similar chest pain episodes in the past     Intermittent palpitations, once every several days to several weeks lasting for less than 1 minute  No associated symptoms of dizziness, weakness, chest pain,  shortness of breath      Left knee pain  Thyroid medications are being adjusted   CT chest Kylie 3.1 cm aorta   CT abdomen OK    History of present illness:  Emily Chou is a 50 y.o. yo female with history of essential hypertension    who presents today for   Chief Complaint   Patient presents with   • aortic dilatation     NEW- on and off chest pain last couple months. some palpiations about 3-4 months. waking her up at night. some SOB with epsiodes. headaches and fatigue everyday.    .    History  Past Medical History:   Diagnosis Date   • Abdominal pain    • Bloating    • Fibroids    • Functional bowel disorder     RECTOCELE   • Heavy periods    • Hypertension    • Menorrhagia    ,   Past Surgical History:   Procedure Laterality Date   • BLADDER SUSPENSION  2011    Dr. Giles in [place]   • ENDOMETRIAL ABLATION     • POSTERIOR VAGINAL REPAIR N/A 5/7/2018    Procedure: RECTOCELE;  Surgeon: Deana Young MD;  Location: [facility];  Service: Obstetrics/Gynecology   • TOTAL  LAPAROSCOPIC HYSTERECTOMY SALPINGO OOPHORECTOMY N/A 5/7/2018    Procedure: TOTAL LAPAROSCOPIC HYSTERECTOMY BILATERAL SALPINGECTOMY WITH DAVINCI SI ROBOT, CYSTO;  Surgeon: Deana Young MD;  Location: [facility];  Service: [facility]   ,   Family History   Problem Relation Age of Onset   • Heart disease Father    • Heart disease Mother    • No Known Problems Brother    • Heart disease Paternal Grandmother    • Heart disease Maternal Grandmother    • No Known Problems Brother    • Uterine cancer Other    • Breast cancer Neg Hx    • Ovarian cancer Neg Hx    ,   Social History     Tobacco Use   • Smoking status: Never Smoker   • Smokeless tobacco: Never Used   Substance Use Topics   • Alcohol use: Yes     Alcohol/week: 3.0 standard drinks     Types: 3 Glasses of wine per week   • Drug use: No   ,     Medications  Current Outpatient Medications   Medication Sig Dispense Refill   • cholecalciferol (VITAMIN D3) 1000 units tablet Take 4,000 Units by mouth Daily.     • escitalopram (LEXAPRO) 10 MG tablet Take 10 mg by mouth Daily.     • ezetimibe (ZETIA) 10 MG tablet      • hydrochlorothiazide (HYDRODIURIL) 25 MG tablet Take 25 mg by mouth Daily.  2   • losartan (COZAAR) 25 MG tablet Take 25 mg by mouth Daily.     • NP Thyroid 30 MG tablet      • SUMAtriptan (IMITREX) 100 MG tablet TAKE 1 TABLET BY MOUTH AS DIRECTED AS NEEDED  0     No current facility-administered medications for this visit.        Allergies:  Ultram [tramadol]    Review of Systems  Review of Systems   Constitution: Negative.   HENT: Negative.    Eyes: Negative.    Cardiovascular: Positive for chest pain, dyspnea on exertion and palpitations. Negative for claudication, cyanosis, irregular heartbeat, leg swelling, near-syncope, orthopnea, paroxysmal nocturnal dyspnea and syncope.   Respiratory: Negative.    Endocrine: Negative.    Hematologic/Lymphatic: Negative.    Skin: Negative.    Gastrointestinal: Negative for anorexia.   Genitourinary: Negative.   "  Neurological: Negative.    Psychiatric/Behavioral: Negative.        Objective     Physical Exam:  /76   Pulse 77   Ht 162.6 cm (64\")   Wt 87.1 kg (192 lb)   LMP 2018 (Exact Date)   SpO2 98%   BMI 32.96 kg/m²     Physical Exam  Constitutional:       Appearance: Normal appearance.   HENT:      Head: Normocephalic.   Eyes:      General: Lids are normal.   Neck:      Vascular: No carotid bruit.   Cardiovascular:      Rate and Rhythm: Regular rhythm.      Heart sounds: Normal heart sounds, S1 normal and S2 normal. No systolic murmur.   Pulmonary:      Effort: Pulmonary effort is normal.   Abdominal:      Palpations: Abdomen is soft.   Neurological:      Mental Status: She is alert.   Psychiatric:         Speech: Speech normal.         Behavior: Behavior normal.         Thought Content: Thought content normal.         Results Review:    Emily Chou  Stress test only, exercise  Order# [id number]  Reading physician: Dani Fernandez MD Ordering physician: Deana Young MD Study date: 5/3/18   Patient Information    Patient Name   Emily Chou MRN   [id number] Sex   Female  (Age)   1970 (50 y.o.)   Interpretation Summary    · Clinically and electrically negative for ischemia.  · Normal functional capacity.  · Cavanaugh treadmill score 10, low risk.            ____________________________________________________________________________________________________________________________________________  Health maintenance and recommendations    Low salt/ HTN/ Heart healthy carbohydrate restricted cardiac diet   The patient is advised to reduce or avoid caffeine or other cardiac stimulants.   Minimize or avoid  NSAID-type medications      Monitor for any signs of bleeding including red or dark stools. Fall precautions.   Advised staying uptodate with immunizations per established standard guidelines.    Offered to give patient  a copy of my notes     Questions were encouraged, asked and answered to the " patient's  understanding and satisfaction. Questions if any regarding current medications and side effects, need for refills and importance of compliance to medications stressed.    Reviewed available prior notes, consults, prior visits, laboratory findings, radiology and cardiology relevant reports. Updated chart as applicable. I have reviewed the patient's medical history in detail and updated the computerized patient record as relevant.      Updated patient regarding any new or relevant abnormalities on review of records or any new findings on physical exam. Mentioned to patient about purpose of visit and desirable health short and long term goals and objectives.    Primary to monitor CBC CMP Lipid panel and TSH as applicable    ___________________________________________________________________________________________________________________________________________   Procedures    Assessment/Plan   Diagnoses and all orders for this visit:    1. Essential hypertension (Primary)    2. Mixed hyperlipidemia    3. Precordial chest pain  -     Adult Stress Echo W/ Cont or Stress Agent if Necessary Per Protocol; Future    4. Palpitations  -     Holter Monitor - 72 Hour Up To 15 Days; Future    5. CARLOS (dyspnea on exertion)  -     Adult Transthoracic Echo Complete w/ Color, Spectral and Contrast if necessary per protocol; Future          Plan    Orders Placed This Encounter   Procedures   • Holter Monitor - 72 Hour Up To 15 Days     Standing Status:   Future     Standing Expiration Date:   1/29/2022     Order Specific Question:   Reason for exam?     Answer:   Palpitations   • Adult Transthoracic Echo Complete w/ Color, Spectral and Contrast if necessary per protocol     Myocardial strain to be performed during echocardiogram as long as technically feasible     Standing Status:   Future     Standing Expiration Date:   1/29/2022     Order Specific Question:   Reason for exam?     Answer:   Dyspnea   • Adult Stress Echo W/  Cont or Stress Agent if Necessary Per Protocol     Standing Status:   Future     Standing Expiration Date:   1/29/2022     Order Specific Question:   What stress agent will be used?     Answer:   Dobutamine     Order Specific Question:   Difficulty walking criteria?     Answer:   Musculoskeletal (hips, knees, feet, back, amputee)     Order Specific Question:   Reason for exam?     Answer:   Chest Pain      Check BP and heart rates twice daily at least 3x / week, week a month  at home and bring a recording for me to review next visit  If BP >130/85 or < 100/60 persistently over 3 reading 30 mins apart call sooner            Return in about 6 weeks (around 3/12/2021).

## 2021-03-01 ENCOUNTER — APPOINTMENT (OUTPATIENT)
Dept: CARDIOLOGY | Facility: HOSPITAL | Age: 51
End: 2021-03-01

## 2021-03-15 ENCOUNTER — HOSPITAL ENCOUNTER (OUTPATIENT)
Dept: CARDIOLOGY | Facility: HOSPITAL | Age: 51
Discharge: HOME | End: 2021-03-15

## 2021-03-15 VITALS
BODY MASS INDEX: 32.44 KG/M2 | SYSTOLIC BLOOD PRESSURE: 150 MMHG | HEIGHT: 64 IN | DIASTOLIC BLOOD PRESSURE: 78 MMHG | WEIGHT: 190 LBS

## 2021-03-15 VITALS
HEART RATE: 74 BPM | BODY MASS INDEX: 32.44 KG/M2 | DIASTOLIC BLOOD PRESSURE: 81 MMHG | HEIGHT: 64 IN | WEIGHT: 190.04 LBS | SYSTOLIC BLOOD PRESSURE: 128 MMHG

## 2021-03-15 DIAGNOSIS — R06.09 DOE (DYSPNEA ON EXERTION): ICD-10-CM

## 2021-03-15 DIAGNOSIS — R07.2 PRECORDIAL CHEST PAIN: ICD-10-CM

## 2021-03-15 PROCEDURE — 93352 ADMIN ECG CONTRAST AGENT: CPT | Performed by: INTERNAL MEDICINE

## 2021-03-15 PROCEDURE — 93018 CV STRESS TEST I&R ONLY: CPT | Performed by: INTERNAL MEDICINE

## 2021-03-15 PROCEDURE — 93350 STRESS TTE ONLY: CPT

## 2021-03-15 PROCEDURE — 93306 TTE W/DOPPLER COMPLETE: CPT

## 2021-03-15 PROCEDURE — 93356 MYOCRD STRAIN IMG SPCKL TRCK: CPT

## 2021-03-15 PROCEDURE — 93350 STRESS TTE ONLY: CPT | Performed by: INTERNAL MEDICINE

## 2021-03-15 PROCEDURE — 93017 CV STRESS TEST TRACING ONLY: CPT

## 2021-03-15 PROCEDURE — 25010000002 PERFLUTREN 6.52 MG/ML SUSPENSION: Performed by: INTERNAL MEDICINE

## 2021-03-15 PROCEDURE — 25010000003 DOBUTAMINE PER 250 MG: Performed by: INTERNAL MEDICINE

## 2021-03-15 RX ORDER — DOBUTAMINE HYDROCHLORIDE 100 MG/100ML
10-50 INJECTION INTRAVENOUS CONTINUOUS
Status: DISCONTINUED | OUTPATIENT
Start: 2021-03-15 | End: 2021-03-16 | Stop reason: HOSPADM

## 2021-03-15 RX ADMIN — PERFLUTREN 8.48 MG: 6.52 INJECTION, SUSPENSION INTRAVENOUS at 10:07

## 2021-03-15 RX ADMIN — Medication 10 MCG/KG/MIN: at 10:08

## 2021-03-15 RX ADMIN — PERFLUTREN 8.48 MG: 6.52 INJECTION, SUSPENSION INTRAVENOUS at 09:46

## 2021-03-16 LAB
BH CV ECHO MEAS - AO MAX PG (FULL): 0.33 MMHG
BH CV ECHO MEAS - AO MAX PG: 7.8 MMHG
BH CV ECHO MEAS - AO MEAN PG (FULL): 0 MMHG
BH CV ECHO MEAS - AO MEAN PG: 4 MMHG
BH CV ECHO MEAS - AO ROOT AREA (BSA CORRECTED): 1.6
BH CV ECHO MEAS - AO ROOT AREA: 7.1 CM^2
BH CV ECHO MEAS - AO ROOT DIAM: 3 CM
BH CV ECHO MEAS - AO V2 MAX: 140 CM/SEC
BH CV ECHO MEAS - AO V2 MEAN: 98.9 CM/SEC
BH CV ECHO MEAS - AO V2 VTI: 32.5 CM
BH CV ECHO MEAS - AVA(I,A): 3.1 CM^2
BH CV ECHO MEAS - AVA(I,D): 3.1 CM^2
BH CV ECHO MEAS - AVA(V,A): 2.8 CM^2
BH CV ECHO MEAS - AVA(V,D): 2.8 CM^2
BH CV ECHO MEAS - BSA(HAYCOCK): 2 M^2
BH CV ECHO MEAS - BSA: 1.9 M^2
BH CV ECHO MEAS - BZI_BMI: 32.6 KILOGRAMS/M^2
BH CV ECHO MEAS - BZI_METRIC_HEIGHT: 162.6 CM
BH CV ECHO MEAS - BZI_METRIC_WEIGHT: 86.2 KG
BH CV ECHO MEAS - EDV(CUBED): 29.5 ML
BH CV ECHO MEAS - EDV(MOD-SP4): 142 ML
BH CV ECHO MEAS - EDV(TEICH): 37.6 ML
BH CV ECHO MEAS - EF(CUBED): 67.2 %
BH CV ECHO MEAS - EF(MOD-SP4): 65.4 %
BH CV ECHO MEAS - EF(TEICH): 60.3 %
BH CV ECHO MEAS - ESV(CUBED): 9.7 ML
BH CV ECHO MEAS - ESV(MOD-SP4): 49.1 ML
BH CV ECHO MEAS - ESV(TEICH): 14.9 ML
BH CV ECHO MEAS - FS: 31.1 %
BH CV ECHO MEAS - IVS/LVPW: 0.94
BH CV ECHO MEAS - IVSD: 1.4 CM
BH CV ECHO MEAS - LA DIMENSION: 3 CM
BH CV ECHO MEAS - LA/AO: 1
BH CV ECHO MEAS - LAT PEAK E' VEL: 10.8 CM/SEC
BH CV ECHO MEAS - LV DIASTOLIC VOL/BSA (35-75): 74.2 ML/M^2
BH CV ECHO MEAS - LV MASS(C)D: 147.8 GRAMS
BH CV ECHO MEAS - LV MASS(C)DI: 77.2 GRAMS/M^2
BH CV ECHO MEAS - LV MAX PG: 7.5 MMHG
BH CV ECHO MEAS - LV MEAN PG: 4 MMHG
BH CV ECHO MEAS - LV SYSTOLIC VOL/BSA (12-30): 25.7 ML/M^2
BH CV ECHO MEAS - LV V1 MAX: 137 CM/SEC
BH CV ECHO MEAS - LV V1 MEAN: 91.1 CM/SEC
BH CV ECHO MEAS - LV V1 VTI: 35.8 CM
BH CV ECHO MEAS - LVIDD: 3.1 CM
BH CV ECHO MEAS - LVIDS: 2.1 CM
BH CV ECHO MEAS - LVLD AP4: 8.2 CM
BH CV ECHO MEAS - LVLS AP4: 7 CM
BH CV ECHO MEAS - LVOT AREA (M): 2.8 CM^2
BH CV ECHO MEAS - LVOT AREA: 2.8 CM^2
BH CV ECHO MEAS - LVOT DIAM: 1.9 CM
BH CV ECHO MEAS - LVPWD: 1.5 CM
BH CV ECHO MEAS - MED PEAK E' VEL: 7.5 CM/SEC
BH CV ECHO MEAS - MV A MAX VEL: 108 CM/SEC
BH CV ECHO MEAS - MV DEC SLOPE: 306 CM/SEC^2
BH CV ECHO MEAS - MV DEC TIME: 0.24 SEC
BH CV ECHO MEAS - MV E MAX VEL: 73.2 CM/SEC
BH CV ECHO MEAS - MV E/A: 0.68
BH CV ECHO MEAS - RAP SYSTOLE: 5 MMHG
BH CV ECHO MEAS - RVSP: 22 MMHG
BH CV ECHO MEAS - SI(AO): 120 ML/M^2
BH CV ECHO MEAS - SI(CUBED): 10.4 ML/M^2
BH CV ECHO MEAS - SI(LVOT): 53 ML/M^2
BH CV ECHO MEAS - SI(MOD-SP4): 48.5 ML/M^2
BH CV ECHO MEAS - SI(TEICH): 11.9 ML/M^2
BH CV ECHO MEAS - SV(AO): 229.7 ML
BH CV ECHO MEAS - SV(CUBED): 19.8 ML
BH CV ECHO MEAS - SV(LVOT): 101.5 ML
BH CV ECHO MEAS - SV(MOD-SP4): 92.9 ML
BH CV ECHO MEAS - SV(TEICH): 22.7 ML
BH CV ECHO MEAS - TR MAX VEL: 206 CM/SEC
BH CV ECHO MEASUREMENTS AVERAGE E/E' RATIO: 8
BH CV STRESS BP STAGE 1: NORMAL
BH CV STRESS BP STAGE 2: NORMAL
BH CV STRESS BP STAGE 3: NORMAL
BH CV STRESS DOSE DOBUTAMINE STAGE 1: 10
BH CV STRESS DOSE DOBUTAMINE STAGE 2: 20
BH CV STRESS DOSE DOBUTAMINE STAGE 3: 30
BH CV STRESS DURATION MIN STAGE 1: 3
BH CV STRESS DURATION MIN STAGE 2: 3
BH CV STRESS DURATION MIN STAGE 3: 2
BH CV STRESS DURATION SEC STAGE 1: 0
BH CV STRESS DURATION SEC STAGE 2: 0
BH CV STRESS DURATION SEC STAGE 3: 11
BH CV STRESS ECHO POST STRESS EJECTION FRACTION EF: 65 %
BH CV STRESS HR STAGE 1: 100
BH CV STRESS HR STAGE 2: 130
BH CV STRESS HR STAGE 3: 144
BH CV STRESS PROTOCOL 1: NORMAL
BH CV STRESS RECOVERY BP: NORMAL MMHG
BH CV STRESS RECOVERY HR: 86 BPM
BH CV STRESS STAGE 1: 1
BH CV STRESS STAGE 2: 2
BH CV STRESS STAGE 3: 3
LEFT ATRIUM VOLUME INDEX: 13.2 ML/M2
LEFT ATRIUM VOLUME: 25.3 CM3
LV EF 2D ECHO EST: 55 %
MAXIMAL PREDICTED HEART RATE: 168 BPM
MAXIMAL PREDICTED HEART RATE: 168 BPM
PERCENT MAX PREDICTED HR: 85.71 %
STRESS BASELINE BP: NORMAL MMHG
STRESS BASELINE HR: 70 BPM
STRESS PERCENT HR: 101 %
STRESS POST EXERCISE DUR MIN: 8 MIN
STRESS POST EXERCISE DUR SEC: 11 SEC
STRESS POST PEAK BP: NORMAL MMHG
STRESS POST PEAK HR: 144 BPM
STRESS TARGET HR: 143 BPM
STRESS TARGET HR: 143 BPM

## 2021-03-24 ENCOUNTER — OFFICE VISIT (OUTPATIENT)
Dept: CARDIOLOGY | Facility: CLINIC | Age: 51
End: 2021-03-24

## 2021-03-24 VITALS
OXYGEN SATURATION: 98 % | WEIGHT: 192 LBS | BODY MASS INDEX: 32.78 KG/M2 | HEART RATE: 79 BPM | DIASTOLIC BLOOD PRESSURE: 100 MMHG | SYSTOLIC BLOOD PRESSURE: 140 MMHG | HEIGHT: 64 IN

## 2021-03-24 DIAGNOSIS — N39.46 URINARY INCONTINENCE, MIXED: ICD-10-CM

## 2021-03-24 DIAGNOSIS — R07.2 PRECORDIAL CHEST PAIN: Primary | ICD-10-CM

## 2021-03-24 DIAGNOSIS — E78.2 MIXED HYPERLIPIDEMIA: ICD-10-CM

## 2021-03-24 DIAGNOSIS — I10 ESSENTIAL HYPERTENSION: ICD-10-CM

## 2021-03-24 PROCEDURE — 99214 OFFICE O/P EST MOD 30 MIN: CPT | Performed by: INTERNAL MEDICINE

## 2021-03-24 RX ORDER — LOSARTAN POTASSIUM 100 MG/1
100 TABLET ORAL DAILY
Qty: 90 TABLET | Refills: 3 | Status: SHIPPED | OUTPATIENT
Start: 2021-03-24

## 2021-03-24 RX ORDER — ATENOLOL 25 MG/1
25 TABLET ORAL DAILY
Qty: 90 TABLET | Refills: 3 | Status: SHIPPED | OUTPATIENT
Start: 2021-03-24

## 2021-03-24 NOTE — PROGRESS NOTES
Emily Chou  [id number]  1970  50 y.o.  female    Referring Provider: Jennie Mcqueen APRN    Reason for  Visit:  Initial visit for  for chest pain and   Cardiac workup test results as below : treadmill in past     Subjective       Overall feels the same   No new events or complaints since last visit   Overall the patient feels no major change from baseline symptoms   Similar symptoms as during last visit     Tolerating current medications well with no untoward side effects   Compliant with prescribed medication regimen. Tries to adhere to cardiac diet.      Chest pain with exertion,  but at times can also occur at rest   Moderate substernal,     Heaviness  Chest pain non pleuritic  Chest pain non positional and non gustatory   Lasts less than 5-10 minutes    Started >6 months ago  Occurs once or twice a week  No associated diaphoresis    Associated nausea  No radiation    Relieved with rest or spontaneously  Not positional    No change with intake of food or antacids  No change with breathing  Mild to moderate associated dyspnea      No associated palpitations  No similar chest pain episodes in the past     Intermittent palpitations, once every several days to several weeks lasting for less than 1 minute  No associated symptoms of dizziness, weakness, chest pain,  shortness of breath    Recent 14-day outpatient cardiac telemetry shows no significant ectopy or arrhythmia     Left knee pain  Thyroid medications are being adjusted   CT chest Kylie 3.1 cm aorta   CT abdomen OK    History of present illness:  Emily Chou is a 50 y.o. yo female with history of essential hypertension    who presents today for   Chief Complaint   Patient presents with   • Hypertension     2 MON FU / RESULTS    .    History  Past Medical History:   Diagnosis Date   • Abdominal pain    • Bloating    • Fibroids    • Functional bowel disorder     RECTOCELE   • Heavy periods    • Hypertension    • Menorrhagia    ,    Past Surgical History:   Procedure Laterality Date   • BLADDER SUSPENSION  2011    Dr. Giles in [place]   • ENDOMETRIAL ABLATION     • POSTERIOR VAGINAL REPAIR N/A 5/7/2018    Procedure: RECTOCELE;  Surgeon: Deana Young MD;  Location: [facility] OR;  Service: Obstetrics/Gynecology   • TOTAL LAPAROSCOPIC HYSTERECTOMY SALPINGO OOPHORECTOMY N/A 5/7/2018    Procedure: TOTAL LAPAROSCOPIC HYSTERECTOMY BILATERAL SALPINGECTOMY WITH DAVINCI SI ROBOT, CYSTO;  Surgeon: Deana Young MD;  Location: [facility] OR;  Service: [facility]   ,   Family History   Problem Relation Age of Onset   • Heart disease Father    • Heart disease Mother    • No Known Problems Brother    • Heart disease Paternal Grandmother    • Heart disease Maternal Grandmother    • No Known Problems Brother    • Uterine cancer Other    • Breast cancer Neg Hx    • Ovarian cancer Neg Hx    ,   Social History     Tobacco Use   • Smoking status: Never Smoker   • Smokeless tobacco: Never Used   Vaping Use   • Vaping Use: Never used   Substance Use Topics   • Alcohol use: Yes     Alcohol/week: 3.0 standard drinks     Types: 3 Glasses of wine per week   • Drug use: No   ,     Medications  Current Outpatient Medications   Medication Sig Dispense Refill   • cholecalciferol (VITAMIN D3) 1000 units tablet Take 4,000 Units by mouth Daily.     • escitalopram (LEXAPRO) 10 MG tablet Take 10 mg by mouth Daily.     • ezetimibe (ZETIA) 10 MG tablet      • hydrochlorothiazide (HYDRODIURIL) 25 MG tablet Take 25 mg by mouth Daily.  2   • losartan (COZAAR) 100 MG tablet Take 1 tablet by mouth Daily. 90 tablet 3   • NP Thyroid 30 MG tablet      • SUMAtriptan (IMITREX) 100 MG tablet TAKE 1 TABLET BY MOUTH AS DIRECTED AS NEEDED  0   • atenolol (TENORMIN) 25 MG tablet Take 1 tablet by mouth Daily. 90 tablet 3     No current facility-administered medications for this visit.       Allergies:  Ultram [tramadol]    Review of Systems  Review of Systems   Constitutional: Negative.   HENT:  "Negative.    Eyes: Negative.    Cardiovascular: Positive for chest pain, dyspnea on exertion and palpitations. Negative for claudication, cyanosis, irregular heartbeat, leg swelling, near-syncope, orthopnea, paroxysmal nocturnal dyspnea and syncope.   Respiratory: Negative.    Endocrine: Negative.    Hematologic/Lymphatic: Negative.    Skin: Negative.    Gastrointestinal: Negative for anorexia.   Genitourinary: Negative.    Neurological: Negative.    Psychiatric/Behavioral: Negative.        Objective     Physical Exam:  /100   Pulse 79   Ht 162.6 cm (64.02\")   Wt 87.1 kg (192 lb)   LMP 2018 (Exact Date)   SpO2 98%   BMI 32.94 kg/m²     Physical Exam  Constitutional:       Appearance: Normal appearance.   HENT:      Head: Normocephalic.   Eyes:      General: Lids are normal.   Neck:      Vascular: No carotid bruit.   Cardiovascular:      Rate and Rhythm: Regular rhythm.      Heart sounds: Normal heart sounds, S1 normal and S2 normal.    No systolic murmur is present.     Pulmonary:      Effort: Pulmonary effort is normal.   Abdominal:      Palpations: Abdomen is soft.   Neurological:      Mental Status: She is alert.   Psychiatric:         Speech: Speech normal.         Behavior: Behavior normal.         Thought Content: Thought content normal.         Results Review:      Emily Chou  Holter Monitor Greater than 7 days up to 15 days  Order# [id number]  Reading physician: Coleman Ware MD Ordering physician: Coleman Ware MD Study date: 21   Patient Information    Patient Name   Emily Chou MRN   [id number] Legal Sex   Female  (Age)   1970 (50 y.o.)   Interpretation Summary       · Average HR: 85. Min HR: 51. Max HR: 156.     ·  Entire report was reviewed.  Monitoring in days: ~13   · The predominant rhythm noted during the testing period was sinus rhythm.     · 7 supraventricular ectopics with an APC burden of: < 0.1 %    · 1 premature ventricular contraction  with a PVC burden " of: < 0.1%     · No correlated arrhythmia  · No significant pauses                  Conclusion:      Baseline rhythm is sinus.  No significant ectopy   No arrhythmia  Benign findings.             Results for orders placed during the hospital encounter of 03/15/21    Adult Stress Echo W/ Cont or Stress Agent if Necessary Per Protocol    Interpretation Summary  · Estimated left ventricular EF = 55% Left ventricular systolic function is normal.  · Low risk stress test for stress induced myocardial ischemia     Emily Chou  Echo Complete w/ Doppler, Color Flow, Contrast and Strain  Order# [id number]  Reading physician: Coleman Ware MD Ordering physician: Coleman Ware MD Study date: 3/15/21   Patient Information    Patient Name   Emily Chou MRN   [id number] Legal Sex   Female  (Age)   1970 (50 y.o.)   PACS Images     Show images for Adult Transthoracic Echo Complete w/ Color, Spectral and Contrast if necessary per protocol    Sedation Narrator Report    Sedation Narrator Report      Interpretation Summary    · Left ventricular wall thickness is consistent with mild concentric hypertrophy.  · Left ventricular ejection fraction appears to be 61 - 65%. Left ventricular systolic function is normal.  · Left ventricular diastolic function was normal.  · Abnormal global longitudinal LV strain (GLS) = -15%  · Estimated right ventricular systolic pressure from tricuspid regurgitation is normal (<35 mmHg).            Emily Chou  Stress test only, exercise  Order# [id number]  Reading physician: Dani Fernandez MD Ordering physician: Deana Young MD Study date: 5/3/18   Patient Information    Patient Name   Emily Chou MRN   [id number] Sex   Female  (Age)   1970 (50 y.o.)   Interpretation Summary    · Clinically and electrically negative for ischemia.  · Normal functional capacity.  · Cavanaugh treadmill score 10, low risk.             ____________________________________________________________________________________________________________________________________________  Health maintenance and recommendations    Low salt/ HTN/ Heart healthy carbohydrate restricted cardiac diet   The patient is advised to reduce or avoid caffeine or other cardiac stimulants.   Minimize or avoid  NSAID-type medications      Monitor for any signs of bleeding including red or dark stools. Fall precautions.   Advised staying uptodate with immunizations per established standard guidelines.    Offered to give patient  a copy of my notes     Questions were encouraged, asked and answered to the patient's  understanding and satisfaction. Questions if any regarding current medications and side effects, need for refills and importance of compliance to medications stressed.    Reviewed available prior notes, consults, prior visits, laboratory findings, radiology and cardiology relevant reports. Updated chart as applicable. I have reviewed the patient's medical history in detail and updated the computerized patient record as relevant.      Updated patient regarding any new or relevant abnormalities on review of records or any new findings on physical exam. Mentioned to patient about purpose of visit and desirable health short and long term goals and objectives.    Primary to monitor CBC CMP Lipid panel and TSH as applicable    ___________________________________________________________________________________________________________________________________________   Procedures    Assessment/Plan   Diagnoses and all orders for this visit:    1. Precordial chest pain (Primary)  -     CT Angiogram Coronary; Future    2. Mixed hyperlipidemia    3. Essential hypertension    4. Urinary incontinence, mixed    Other orders  -     losartan (COZAAR) 100 MG tablet; Take 1 tablet by mouth Daily.  Dispense: 90 tablet; Refill: 3  -     atenolol (TENORMIN) 25 MG tablet; Take 1 tablet by  mouth Daily.  Dispense: 90 tablet; Refill: 3        Plan    Patient expressed understanding  Encouraged and answered all questions   Discussed with the patient and all questioned fully answered. She will call me if any problems arise.   Discussed results of prior testing with patient: echo , stress test and 14-day outpatient cardiac telemetry       Check BP and heart rates twice daily at least 3x / week, week a month  at home and bring a recording for me to review next visit  If BP >130/85 or < 100/60 persistently over 3 reading 30 mins apart call sooner      Needs better BP control      Requested Prescriptions     Signed Prescriptions Disp Refills   • losartan (COZAAR) 100 MG tablet 90 tablet 3     Sig: Take 1 tablet by mouth Daily.   • atenolol (TENORMIN) 25 MG tablet 90 tablet 3     Sig: Take 1 tablet by mouth Daily.      Orders Placed This Encounter   Procedures   • CT Angiogram Coronary     Standing Status:   Future     Standing Expiration Date:   3/24/2022      Buy BP machine and monitor BP    Check BP and heart rates twice daily at least 3x / week at home and bring a recording for me to review next visit  If BP >130/85 or < 100/60 persistently over 3 reading 30 mins apart call sooner        Coronary CT angiography would have the highest predictive value for cardiac events based on calcium scoring as well as high risk negative predictive value if no significant coronary artery disease is identified  This would be the most cost effective test for this patient with intermittent chest pain with multiple risk factors for coronary artery disease  Hopefully his insurance will see the benefit and approved coronary CT angiography     Patient undecided regarding the Covid vaccine   Urged to consider vaccination further   I can provide more input if required   Recommend further discussion with primary care provider           Return in about 6 weeks (around 5/5/2021).

## 2021-04-20 ENCOUNTER — HOSPITAL ENCOUNTER (OUTPATIENT)
Dept: CT IMAGING | Facility: HOSPITAL | Age: 51
Discharge: HOME | End: 2021-04-20
Admitting: INTERNAL MEDICINE

## 2021-04-20 VITALS
SYSTOLIC BLOOD PRESSURE: 127 MMHG | HEART RATE: 61 BPM | DIASTOLIC BLOOD PRESSURE: 78 MMHG | BODY MASS INDEX: 32.33 KG/M2 | OXYGEN SATURATION: 97 % | WEIGHT: 189.4 LBS | RESPIRATION RATE: 14 BRPM | HEIGHT: 64 IN

## 2021-04-20 DIAGNOSIS — R07.2 PRECORDIAL CHEST PAIN: ICD-10-CM

## 2021-04-20 LAB
CREAT SERPL-MCNC: 0.81 MG/DL (ref 0.57–1)
GFR SERPL CREATININE-BSD FRML MDRD: 74 ML/MIN/1.73

## 2021-04-20 PROCEDURE — 75574 CT ANGIO HRT W/3D IMAGE: CPT | Performed by: INTERNAL MEDICINE

## 2021-04-20 PROCEDURE — 0 IOPAMIDOL PER 1 ML: Performed by: INTERNAL MEDICINE

## 2021-04-20 PROCEDURE — 75574 CT ANGIO HRT W/3D IMAGE: CPT

## 2021-04-20 PROCEDURE — 82565 ASSAY OF CREATININE: CPT | Performed by: INTERNAL MEDICINE

## 2021-04-20 RX ORDER — METOPROLOL TARTRATE 50 MG/1
50 TABLET, FILM COATED ORAL ONCE AS NEEDED
Status: DISCONTINUED | OUTPATIENT
Start: 2021-04-20 | End: 2021-04-21 | Stop reason: HOSPADM

## 2021-04-20 RX ORDER — METOPROLOL TARTRATE 100 MG/1
100 TABLET ORAL ONCE AS NEEDED
Status: DISCONTINUED | OUTPATIENT
Start: 2021-04-20 | End: 2021-04-21 | Stop reason: HOSPADM

## 2021-04-20 RX ORDER — SODIUM CHLORIDE 0.9 % (FLUSH) 0.9 %
10 SYRINGE (ML) INJECTION AS NEEDED
Status: DISCONTINUED | OUTPATIENT
Start: 2021-04-20 | End: 2021-04-21 | Stop reason: HOSPADM

## 2021-04-20 RX ORDER — SODIUM CHLORIDE 0.9 % (FLUSH) 0.9 %
3 SYRINGE (ML) INJECTION EVERY 12 HOURS SCHEDULED
Status: DISCONTINUED | OUTPATIENT
Start: 2021-04-20 | End: 2021-04-21 | Stop reason: HOSPADM

## 2021-04-20 RX ORDER — NITROGLYCERIN 0.4 MG/1
0.4 TABLET SUBLINGUAL
Status: COMPLETED | OUTPATIENT
Start: 2021-04-20 | End: 2021-04-20

## 2021-04-20 RX ORDER — LIDOCAINE HYDROCHLORIDE 10 MG/ML
5 INJECTION, SOLUTION EPIDURAL; INFILTRATION; INTRACAUDAL; PERINEURAL AS NEEDED
Status: DISCONTINUED | OUTPATIENT
Start: 2021-04-20 | End: 2021-04-21 | Stop reason: HOSPADM

## 2021-04-20 RX ORDER — METOPROLOL TARTRATE 5 MG/5ML
5 INJECTION INTRAVENOUS
Status: DISCONTINUED | OUTPATIENT
Start: 2021-04-20 | End: 2021-04-21 | Stop reason: HOSPADM

## 2021-04-20 RX ADMIN — IOPAMIDOL 75 ML: 755 INJECTION, SOLUTION INTRAVENOUS at 12:57

## 2021-04-20 RX ADMIN — NITROGLYCERIN 0.4 MG: 0.4 TABLET SUBLINGUAL at 12:49

## 2024-05-08 NOTE — THERAPY DISCHARGE NOTE
Outpatient Physical Therapy Discharge Summary         Patient Name: Emily Chou  : 1970  MRN: [id number]    Today's Date: 2019    Visit Dx:  No diagnosis found.    PT OP Goals     Row Name 19 1100          PT Short Term Goals    STG Date to Achieve  19  -KR     STG 1  Pt will isolate her pelvic floor with proper breathing in supine.   -KR     STG 1 Progress  Partially Met  -KR        Long Term Goals    LTG Date to Achieve  19  -KR     LTG 1  Pt will isolate her pelvic floor with proper breathing in sitting, holding 5 seconds or greater.   -KR     LTG 1 Progress  Not Met  -KR     LTG 2  Pt will be independent with HEP to include pelvic floor, hip and core strengthening.   -KR     LTG 2 Progress  Not Met  -KR     LTG 3  Pt will report 1 or less incontinence episodes in one week.   -KR     LTG 3 Progress  Not Met  -KR     LTG 4  Pt will isolate her pelvic floor with proper breathing in standing.   -KR     LTG 4 Progress  Not Met  -KR       User Key  (r) = Recorded By, (t) = Taken By, (c) = Cosigned By    Initials Name Provider Type    Darleen Buckner, PT DPT Physical Therapist          OP PT Discharge Summary  Date of Discharge: 19  Reason for Discharge: Non-compliant  Outcomes Achieved: Other, Refer to plan of care for updates on goals achieved  Discharge Destination: Home without follow-up  Discharge Instructions/Additional Comments: Patient cancelled and no showed for several appointments.      Time Calculation:                    Darleen Still PT DPT  2019        Called pt to confirm appt for tomorrow with Dr. Solitario and patient said that the loop recorder battery is dead.  Patient would like to know if she should bring in the remote device check machine with her tomorrow or bring it in today.  Patient would like to know if you want her to come early tomorrow.  Please call and advise.  [email or web address]

## (undated) DEVICE — GLV SURG NEOLON 2G PF LF 6 STRL

## (undated) DEVICE — ST TBG AIRSEAL FLTR TRI LUM

## (undated) DEVICE — OBT BLADLES ENDOWRIST DAVINCI/S 8MM

## (undated) DEVICE — TOTAL TRAY, 16FR 10ML SIL FOLEY, URN: Brand: MEDLINE

## (undated) DEVICE — GLV SURG TRIUMPH NATURAL W/ALOE PF LTX 7 STRL

## (undated) DEVICE — ANTIBACTERIAL VIOLET BRAIDED (POLYGLACTIN 910), SYNTHETIC ABSORBABLE SUTURE: Brand: COATED VICRYL

## (undated) DEVICE — SKIN AFFIX SURG ADHESIVE 72/CS 0.55ML: Brand: MEDLINE

## (undated) DEVICE — SPNG GZ PKNG XRAY/DETECT 4PLY 2X36IN STRL

## (undated) DEVICE — PK TURNOVER RM ADV

## (undated) DEVICE — ANTIBACTERIAL UNDYED BRAIDED (POLYGLACTIN 910), SYNTHETIC ABSORBABLE SUTURE: Brand: COATED VICRYL

## (undated) DEVICE — CLTH CLENS READYCLEANSE PERI CARE PK/5

## (undated) DEVICE — CYSTO/BLADDER IRRIGATION SET, REGULATING CLAMP

## (undated) DEVICE — VCARE MEDIUM, UTERINE MANIPULATOR, VAGINAL-CERVICAL-AHLUWALIA'S-RETRACTOR-ELEVATOR: Brand: VCARE

## (undated) DEVICE — 1000 SES SMOKE EVACUATION SYSTEM, HAND HELD TUBING SET: Brand: 1000 SES

## (undated) DEVICE — GLV SURG TRIUMPH NATURAL W/ALOE PF LTX 6 STRL

## (undated) DEVICE — NDL HYPO PRECISIONGLIDE REG 22G 1 1/2

## (undated) DEVICE — Device

## (undated) DEVICE — HEWSON SUTURE RETRIEVER: Brand: HEWSON SUTURE RETRIEVER

## (undated) DEVICE — DAVINCI: Brand: MEDLINE INDUSTRIES, INC.

## (undated) DEVICE — SYR CONTRL LUERLOK 10CC

## (undated) DEVICE — TIP COVER ACCESSORY

## (undated) DEVICE — APPL HEMO SURG ARISTA/AH/FLEXITIP XL 38CM

## (undated) DEVICE — LAPAROSCOPY WOUND CLOSURE SYSTEM KIT CONSISTS OF:05391529640002; NEOCLOSE ANCHORGUIDE 5/12 & 8/15 US; MODEL NUMBER NCA515-U; QTY 10 AND05391529640019; NEOCLOSE AUTOANCHOR X2 PACK US; MODEL NUMBER NCA2-U;  QTY 10: Brand: NEOCLOSE ANCHORGUIDE REGULAR PORT CLOSURE KIT US

## (undated) DEVICE — TROC ANCHORPORT BLADELES LP 8X120MM

## (undated) DEVICE — SPONGE,LAP,4"X18",XR,ST,5/PK,40PK/CS: Brand: MEDLINE INDUSTRIES, INC.

## (undated) DEVICE — ELECTRD BLD EDGE/INSUL1P 2.4X5.1MM STRL

## (undated) DEVICE — PAD MAJOR LITHOTOMY: Brand: MEDLINE INDUSTRIES, INC.